# Patient Record
Sex: MALE | Race: WHITE | Employment: OTHER | URBAN - METROPOLITAN AREA
[De-identification: names, ages, dates, MRNs, and addresses within clinical notes are randomized per-mention and may not be internally consistent; named-entity substitution may affect disease eponyms.]

---

## 2020-03-01 ENCOUNTER — HOSPITAL ENCOUNTER (INPATIENT)
Age: 65
LOS: 3 days | Discharge: HOME | DRG: 312 | End: 2020-03-04
Attending: EMERGENCY MEDICINE | Admitting: INTERNAL MEDICINE
Payer: COMMERCIAL

## 2020-03-01 ENCOUNTER — APPOINTMENT (OUTPATIENT)
Dept: CT IMAGING | Age: 65
DRG: 312 | End: 2020-03-01
Payer: COMMERCIAL

## 2020-03-01 ENCOUNTER — APPOINTMENT (OUTPATIENT)
Dept: GENERAL RADIOLOGY | Age: 65
DRG: 312 | End: 2020-03-01
Payer: COMMERCIAL

## 2020-03-01 PROBLEM — R55 SYNCOPE AND COLLAPSE: Status: ACTIVE | Noted: 2020-03-01

## 2020-03-01 LAB
-: ABNORMAL
ABSOLUTE EOS #: 0.18 K/UL (ref 0–0.4)
ABSOLUTE IMMATURE GRANULOCYTE: 0 K/UL (ref 0–0.3)
ABSOLUTE LYMPH #: 2.39 K/UL (ref 1–4.8)
ABSOLUTE MONO #: 1.84 K/UL (ref 0.1–0.8)
ALBUMIN SERPL-MCNC: 3.4 G/DL (ref 3.5–5.2)
ALBUMIN/GLOBULIN RATIO: 0.7 (ref 1–2.5)
ALP BLD-CCNC: 76 U/L (ref 40–129)
ALT SERPL-CCNC: 15 U/L (ref 5–41)
AMORPHOUS: ABNORMAL
ANION GAP SERPL CALCULATED.3IONS-SCNC: 17 MMOL/L (ref 9–17)
AST SERPL-CCNC: 23 U/L
BACTERIA: ABNORMAL
BASOPHILS # BLD: 0 % (ref 0–2)
BASOPHILS ABSOLUTE: 0 K/UL (ref 0–0.2)
BILIRUB SERPL-MCNC: 0.82 MG/DL (ref 0.3–1.2)
BILIRUBIN DIRECT: 0.34 MG/DL
BILIRUBIN URINE: NEGATIVE
BILIRUBIN, INDIRECT: 0.48 MG/DL (ref 0–1)
BNP INTERPRETATION: ABNORMAL
BUN BLDV-MCNC: 29 MG/DL (ref 8–23)
BUN/CREAT BLD: ABNORMAL (ref 9–20)
CALCIUM SERPL-MCNC: 9.5 MG/DL (ref 8.6–10.4)
CASTS UA: ABNORMAL /LPF (ref 0–2)
CASTS UA: ABNORMAL /LPF (ref 0–2)
CHLORIDE BLD-SCNC: 94 MMOL/L (ref 98–107)
CO2: 19 MMOL/L (ref 20–31)
COLOR: YELLOW
CREAT SERPL-MCNC: 1.4 MG/DL (ref 0.7–1.2)
CRYSTALS, UA: ABNORMAL /HPF
DIFFERENTIAL TYPE: ABNORMAL
EOSINOPHILS RELATIVE PERCENT: 1 % (ref 1–4)
EPITHELIAL CELLS UA: ABNORMAL /HPF (ref 0–5)
GFR AFRICAN AMERICAN: >60 ML/MIN
GFR NON-AFRICAN AMERICAN: 51 ML/MIN
GFR SERPL CREATININE-BSD FRML MDRD: ABNORMAL ML/MIN/{1.73_M2}
GFR SERPL CREATININE-BSD FRML MDRD: ABNORMAL ML/MIN/{1.73_M2}
GLOBULIN: ABNORMAL G/DL (ref 1.5–3.8)
GLUCOSE BLD-MCNC: 236 MG/DL (ref 70–99)
GLUCOSE BLD-MCNC: 264 MG/DL (ref 75–110)
GLUCOSE URINE: NEGATIVE
HCT VFR BLD CALC: 38.6 % (ref 40.7–50.3)
HEMOGLOBIN: 12.4 G/DL (ref 13–17)
IMMATURE GRANULOCYTES: 0 %
INR BLD: 1.1
KETONES, URINE: NEGATIVE
LACTIC ACID, SEPSIS WHOLE BLOOD: 1.8 MMOL/L (ref 0.5–1.9)
LACTIC ACID, SEPSIS WHOLE BLOOD: 3.1 MMOL/L (ref 0.5–1.9)
LACTIC ACID, SEPSIS: ABNORMAL MMOL/L (ref 0.5–1.9)
LACTIC ACID, SEPSIS: NORMAL MMOL/L (ref 0.5–1.9)
LEUKOCYTE ESTERASE, URINE: NEGATIVE
LYMPHOCYTES # BLD: 13 % (ref 24–44)
MCH RBC QN AUTO: 29.7 PG (ref 25.2–33.5)
MCHC RBC AUTO-ENTMCNC: 32.1 G/DL (ref 28.4–34.8)
MCV RBC AUTO: 92.6 FL (ref 82.6–102.9)
MONOCYTES # BLD: 10 % (ref 1–7)
MORPHOLOGY: NORMAL
MUCUS: ABNORMAL
NITRITE, URINE: NEGATIVE
NRBC AUTOMATED: 0 PER 100 WBC
OTHER OBSERVATIONS UA: ABNORMAL
PARTIAL THROMBOPLASTIN TIME: 24.3 SEC (ref 20.5–30.5)
PDW BLD-RTO: 13.2 % (ref 11.8–14.4)
PH UA: 6 (ref 5–8)
PLATELET # BLD: 368 K/UL (ref 138–453)
PLATELET ESTIMATE: ABNORMAL
PMV BLD AUTO: 10.3 FL (ref 8.1–13.5)
POTASSIUM SERPL-SCNC: 3.7 MMOL/L (ref 3.7–5.3)
PRO-BNP: 319 PG/ML
PROTEIN UA: ABNORMAL
PROTHROMBIN TIME: 11.1 SEC (ref 9–12)
RBC # BLD: 4.17 M/UL (ref 4.21–5.77)
RBC # BLD: ABNORMAL 10*6/UL
RBC UA: ABNORMAL /HPF (ref 0–2)
RENAL EPITHELIAL, UA: ABNORMAL /HPF
SEG NEUTROPHILS: 76 % (ref 36–66)
SEGMENTED NEUTROPHILS ABSOLUTE COUNT: 13.99 K/UL (ref 1.8–7.7)
SODIUM BLD-SCNC: 130 MMOL/L (ref 135–144)
SPECIFIC GRAVITY UA: 1.04 (ref 1–1.03)
TOTAL PROTEIN: 8.1 G/DL (ref 6.4–8.3)
TRICHOMONAS: ABNORMAL
TROPONIN INTERP: NORMAL
TROPONIN INTERP: NORMAL
TROPONIN T: NORMAL NG/ML
TROPONIN T: NORMAL NG/ML
TROPONIN, HIGH SENSITIVITY: 16 NG/L (ref 0–22)
TROPONIN, HIGH SENSITIVITY: 16 NG/L (ref 0–22)
TURBIDITY: CLEAR
URINE HGB: NEGATIVE
UROBILINOGEN, URINE: NORMAL
WBC # BLD: 18.4 K/UL (ref 3.5–11.3)
WBC # BLD: ABNORMAL 10*3/UL
WBC UA: ABNORMAL /HPF (ref 0–5)
YEAST: ABNORMAL

## 2020-03-01 PROCEDURE — 84146 ASSAY OF PROLACTIN: CPT

## 2020-03-01 PROCEDURE — 82947 ASSAY GLUCOSE BLOOD QUANT: CPT

## 2020-03-01 PROCEDURE — 87040 BLOOD CULTURE FOR BACTERIA: CPT

## 2020-03-01 PROCEDURE — 71260 CT THORAX DX C+: CPT

## 2020-03-01 PROCEDURE — 6370000000 HC RX 637 (ALT 250 FOR IP): Performed by: STUDENT IN AN ORGANIZED HEALTH CARE EDUCATION/TRAINING PROGRAM

## 2020-03-01 PROCEDURE — 83880 ASSAY OF NATRIURETIC PEPTIDE: CPT

## 2020-03-01 PROCEDURE — 81001 URINALYSIS AUTO W/SCOPE: CPT

## 2020-03-01 PROCEDURE — 96365 THER/PROPH/DIAG IV INF INIT: CPT

## 2020-03-01 PROCEDURE — 83605 ASSAY OF LACTIC ACID: CPT

## 2020-03-01 PROCEDURE — 96366 THER/PROPH/DIAG IV INF ADDON: CPT

## 2020-03-01 PROCEDURE — 6360000002 HC RX W HCPCS: Performed by: STUDENT IN AN ORGANIZED HEALTH CARE EDUCATION/TRAINING PROGRAM

## 2020-03-01 PROCEDURE — 99223 1ST HOSP IP/OBS HIGH 75: CPT | Performed by: NURSE PRACTITIONER

## 2020-03-01 PROCEDURE — 96367 TX/PROPH/DG ADDL SEQ IV INF: CPT

## 2020-03-01 PROCEDURE — 96368 THER/DIAG CONCURRENT INF: CPT

## 2020-03-01 PROCEDURE — 80076 HEPATIC FUNCTION PANEL: CPT

## 2020-03-01 PROCEDURE — 2060000000 HC ICU INTERMEDIATE R&B

## 2020-03-01 PROCEDURE — 85025 COMPLETE CBC W/AUTO DIFF WBC: CPT

## 2020-03-01 PROCEDURE — 85730 THROMBOPLASTIN TIME PARTIAL: CPT

## 2020-03-01 PROCEDURE — G0378 HOSPITAL OBSERVATION PER HR: HCPCS

## 2020-03-01 PROCEDURE — 80048 BASIC METABOLIC PNL TOTAL CA: CPT

## 2020-03-01 PROCEDURE — 71046 X-RAY EXAM CHEST 2 VIEWS: CPT

## 2020-03-01 PROCEDURE — 87086 URINE CULTURE/COLONY COUNT: CPT

## 2020-03-01 PROCEDURE — 83036 HEMOGLOBIN GLYCOSYLATED A1C: CPT

## 2020-03-01 PROCEDURE — 6360000004 HC RX CONTRAST MEDICATION: Performed by: EMERGENCY MEDICINE

## 2020-03-01 PROCEDURE — 84484 ASSAY OF TROPONIN QUANT: CPT

## 2020-03-01 PROCEDURE — 93005 ELECTROCARDIOGRAM TRACING: CPT | Performed by: STUDENT IN AN ORGANIZED HEALTH CARE EDUCATION/TRAINING PROGRAM

## 2020-03-01 PROCEDURE — 85610 PROTHROMBIN TIME: CPT

## 2020-03-01 PROCEDURE — 2580000003 HC RX 258: Performed by: STUDENT IN AN ORGANIZED HEALTH CARE EDUCATION/TRAINING PROGRAM

## 2020-03-01 PROCEDURE — 99285 EMERGENCY DEPT VISIT HI MDM: CPT

## 2020-03-01 RX ORDER — DEXTROSE MONOHYDRATE 50 MG/ML
100 INJECTION, SOLUTION INTRAVENOUS PRN
Status: DISCONTINUED | OUTPATIENT
Start: 2020-03-01 | End: 2020-03-04 | Stop reason: HOSPADM

## 2020-03-01 RX ORDER — DEXTROSE MONOHYDRATE 25 G/50ML
12.5 INJECTION, SOLUTION INTRAVENOUS PRN
Status: DISCONTINUED | OUTPATIENT
Start: 2020-03-01 | End: 2020-03-04 | Stop reason: HOSPADM

## 2020-03-01 RX ORDER — ALPRAZOLAM 0.5 MG/1
0.5 TABLET ORAL 2 TIMES DAILY
Status: DISCONTINUED | OUTPATIENT
Start: 2020-03-02 | End: 2020-03-04 | Stop reason: HOSPADM

## 2020-03-01 RX ORDER — MAGNESIUM SULFATE 1 G/100ML
1 INJECTION INTRAVENOUS PRN
Status: DISCONTINUED | OUTPATIENT
Start: 2020-03-01 | End: 2020-03-04 | Stop reason: HOSPADM

## 2020-03-01 RX ORDER — PROMETHAZINE HYDROCHLORIDE 25 MG/1
12.5 TABLET ORAL EVERY 6 HOURS PRN
Status: DISCONTINUED | OUTPATIENT
Start: 2020-03-01 | End: 2020-03-04 | Stop reason: HOSPADM

## 2020-03-01 RX ORDER — SODIUM CHLORIDE 0.9 % (FLUSH) 0.9 %
10 SYRINGE (ML) INJECTION PRN
Status: DISCONTINUED | OUTPATIENT
Start: 2020-03-01 | End: 2020-03-04 | Stop reason: HOSPADM

## 2020-03-01 RX ORDER — HYDROCODONE BITARTRATE AND ACETAMINOPHEN 5; 325 MG/1; MG/1
1 TABLET ORAL ONCE
Status: COMPLETED | OUTPATIENT
Start: 2020-03-01 | End: 2020-03-01

## 2020-03-01 RX ORDER — SODIUM CHLORIDE 0.9 % (FLUSH) 0.9 %
10 SYRINGE (ML) INJECTION EVERY 12 HOURS SCHEDULED
Status: DISCONTINUED | OUTPATIENT
Start: 2020-03-02 | End: 2020-03-04 | Stop reason: HOSPADM

## 2020-03-01 RX ORDER — ACETAMINOPHEN 325 MG/1
650 TABLET ORAL EVERY 6 HOURS PRN
Status: DISCONTINUED | OUTPATIENT
Start: 2020-03-01 | End: 2020-03-04 | Stop reason: HOSPADM

## 2020-03-01 RX ORDER — ASPIRIN 81 MG/1
81 TABLET ORAL DAILY
Status: DISCONTINUED | OUTPATIENT
Start: 2020-03-02 | End: 2020-03-04 | Stop reason: HOSPADM

## 2020-03-01 RX ORDER — GABAPENTIN 600 MG/1
600 TABLET ORAL 3 TIMES DAILY
Status: DISCONTINUED | OUTPATIENT
Start: 2020-03-02 | End: 2020-03-04 | Stop reason: HOSPADM

## 2020-03-01 RX ORDER — POTASSIUM CHLORIDE 7.45 MG/ML
10 INJECTION INTRAVENOUS PRN
Status: DISCONTINUED | OUTPATIENT
Start: 2020-03-01 | End: 2020-03-04 | Stop reason: HOSPADM

## 2020-03-01 RX ORDER — SODIUM CHLORIDE 9 MG/ML
INJECTION, SOLUTION INTRAVENOUS CONTINUOUS
Status: DISCONTINUED | OUTPATIENT
Start: 2020-03-02 | End: 2020-03-04 | Stop reason: HOSPADM

## 2020-03-01 RX ORDER — LIDOCAINE HYDROCHLORIDE 20 MG/ML
JELLY TOPICAL PRN
Status: DISCONTINUED | OUTPATIENT
Start: 2020-03-01 | End: 2020-03-04 | Stop reason: HOSPADM

## 2020-03-01 RX ORDER — ACETAMINOPHEN 650 MG/1
650 SUPPOSITORY RECTAL EVERY 6 HOURS PRN
Status: DISCONTINUED | OUTPATIENT
Start: 2020-03-01 | End: 2020-03-04 | Stop reason: HOSPADM

## 2020-03-01 RX ORDER — ONDANSETRON 2 MG/ML
4 INJECTION INTRAMUSCULAR; INTRAVENOUS EVERY 6 HOURS PRN
Status: DISCONTINUED | OUTPATIENT
Start: 2020-03-01 | End: 2020-03-04 | Stop reason: HOSPADM

## 2020-03-01 RX ORDER — LISINOPRIL AND HYDROCHLOROTHIAZIDE 20; 12.5 MG/1; MG/1
1 TABLET ORAL 2 TIMES DAILY
Status: DISCONTINUED | OUTPATIENT
Start: 2020-03-02 | End: 2020-03-03

## 2020-03-01 RX ORDER — POLYETHYLENE GLYCOL 3350 17 G/17G
17 POWDER, FOR SOLUTION ORAL DAILY PRN
Status: DISCONTINUED | OUTPATIENT
Start: 2020-03-01 | End: 2020-03-04 | Stop reason: HOSPADM

## 2020-03-01 RX ORDER — SODIUM CHLORIDE, SODIUM LACTATE, POTASSIUM CHLORIDE, CALCIUM CHLORIDE 600; 310; 30; 20 MG/100ML; MG/100ML; MG/100ML; MG/100ML
1000 INJECTION, SOLUTION INTRAVENOUS ONCE
Status: COMPLETED | OUTPATIENT
Start: 2020-03-01 | End: 2020-03-01

## 2020-03-01 RX ORDER — VANCOMYCIN HYDROCHLORIDE 1 G/200ML
1000 INJECTION, SOLUTION INTRAVENOUS EVERY 12 HOURS
Status: DISCONTINUED | OUTPATIENT
Start: 2020-03-02 | End: 2020-03-01

## 2020-03-01 RX ORDER — ACETAMINOPHEN 325 MG/1
650 TABLET ORAL EVERY 6 HOURS PRN
Status: DISCONTINUED | OUTPATIENT
Start: 2020-03-01 | End: 2020-03-01

## 2020-03-01 RX ORDER — NICOTINE POLACRILEX 4 MG
15 LOZENGE BUCCAL PRN
Status: DISCONTINUED | OUTPATIENT
Start: 2020-03-01 | End: 2020-03-04 | Stop reason: HOSPADM

## 2020-03-01 RX ORDER — ACETAMINOPHEN 650 MG/1
650 SUPPOSITORY RECTAL EVERY 6 HOURS PRN
Status: DISCONTINUED | OUTPATIENT
Start: 2020-03-01 | End: 2020-03-01

## 2020-03-01 RX ORDER — NICOTINE 21 MG/24HR
1 PATCH, TRANSDERMAL 24 HOURS TRANSDERMAL DAILY PRN
Status: DISCONTINUED | OUTPATIENT
Start: 2020-03-01 | End: 2020-03-04 | Stop reason: HOSPADM

## 2020-03-01 RX ORDER — POTASSIUM CHLORIDE 20 MEQ/1
40 TABLET, EXTENDED RELEASE ORAL PRN
Status: DISCONTINUED | OUTPATIENT
Start: 2020-03-01 | End: 2020-03-04 | Stop reason: HOSPADM

## 2020-03-01 RX ADMIN — VANCOMYCIN HYDROCHLORIDE 2000 MG: 1 INJECTION, POWDER, LYOPHILIZED, FOR SOLUTION INTRAVENOUS at 20:41

## 2020-03-01 RX ADMIN — PIPERACILLIN AND TAZOBACTAM 3.38 G: 3; .375 INJECTION, POWDER, FOR SOLUTION INTRAVENOUS at 19:59

## 2020-03-01 RX ADMIN — IOHEXOL 75 ML: 350 INJECTION, SOLUTION INTRAVENOUS at 20:07

## 2020-03-01 RX ADMIN — HYDROCODONE BITARTRATE AND ACETAMINOPHEN 1 TABLET: 5; 325 TABLET ORAL at 21:09

## 2020-03-01 RX ADMIN — SODIUM CHLORIDE, POTASSIUM CHLORIDE, SODIUM LACTATE AND CALCIUM CHLORIDE 1000 ML: 600; 310; 30; 20 INJECTION, SOLUTION INTRAVENOUS at 19:16

## 2020-03-01 ASSESSMENT — ENCOUNTER SYMPTOMS
ABDOMINAL PAIN: 0
COLOR CHANGE: 0
CHEST TIGHTNESS: 0
VOMITING: 0
CONSTIPATION: 0
TROUBLE SWALLOWING: 0
SHORTNESS OF BREATH: 0
BACK PAIN: 0
NAUSEA: 0
DIARRHEA: 0
COUGH: 0

## 2020-03-01 ASSESSMENT — PAIN SCALES - GENERAL: PAINLEVEL_OUTOF10: 8

## 2020-03-01 NOTE — ED PROVIDER NOTES
[facility]     Emergency Department     Faculty Attestation    I performed a history and physical examination of the patient and discussed management with the resident. I reviewed the residents note and agree with the documented findings and plan of care. Any areas of disagreement are noted on the chart. I was personally present for the key portions of any procedures. I have documented in the chart those procedures where I was not present during the key portions. I have reviewed the emergency nurses triage note. I agree with the chief complaint, past medical history, past surgical history, allergies, medications, social and family history as documented unless otherwise noted below. For Physician Assistant/ Nurse Practitioner cases/documentation I have personally evaluated this patient and have completed at least one if not all key elements of the E/M (history, physical exam, and MDM). Additional findings are as noted. I have personally seen and evaluated the patient. I find the patient's history and physical exam are consistent with the NP/PA documentation. I agree with the care provided, treatment rendered, disposition and follow-up plan. Patient had episode of syncope today no preceding symptoms and no chest pain no shortness of breath currently asymptomatic. EKG Interpretation    Interpreted by emergency department physician    Rhythm: normal sinus   Rate: normal  Axis: normal  Conduction: normal  ST Segments: no acute change  T Waves: no acute change  Q Waves: no acute change    Clinical Impression:  nonspecific EKG. Critical Care     Waylon Sal M.D.   Attending Emergency  Physician              Katrin Jaimes MD  03/01/20 [id number]

## 2020-03-01 NOTE — ED NOTES
Pt placed on cardiac monitor, BP cuff, and pulse ox. Alarms set.       Syed Betancourt RN  03/01/20 3294

## 2020-03-01 NOTE — ED NOTES
Patient brought into er per EMS from home today for syncopal episode at home PTA while sitting watching TV  Reports that he does not remember syncopal episode, family at home when episode occurred and called EMS  Pt arrives to ER a&o x4  Nondistressed  EMS reports that patients EKG was stamping STEMI multiple times  Upon arrival to ER, EKG completed and not showing STEMI    Placed on full cardiac monitor    IV established PTA per EMS, blood labs collected     GCS=15   Denies N/V/D noted at this time    Nondistressed, VS STABLE  Patient updated on plan of care and processes  Denies complaints     [address] Mary Ann, RN  03/01/20 5712

## 2020-03-01 NOTE — ED PROVIDER NOTES
[facility] ED  Emergency Department Encounter  EmergencyMedicine Resident     Pt Name:Raheel Ash  MRN: [id number]  [place] 1955  Date of evaluation: 3/1/20  PCP:  Pilo Villarreal MD    [address] [place] [address]       Chief Complaint   Patient presents with    Loss of Consciousness       HISTORY OF PRESENT ILLNESS  (Location/Symptom, Timing/Onset, Context/Setting, Quality, Duration, Modifying Factors, Severity.)      Burke Rhodes is a 71 y.o. male who presents with acute syncopal episode at home while watching TV. Patient was in his recliner watching TV in the next thing he remembers is his wife waking him up on the floor. Unclear per EMS how long he was unresponsive for however patient does not remember the transition from the chair to the floor. No seizure-like activity described without a postictal state. History of type 2 diabetes on long-acting insulin twice daily and antihypertensive medications. Patient says he is been compliant with all medications and all doctor's appointments. No complaints at this time, feels well. Prior to this episode has been feeling well without any fevers, chills, vision changes, headaches, nausea, vomiting, chest pain, shortness breath, abdominal pain, changes in  or GI habits. PAST MEDICAL / SURGICAL / SOCIAL / FAMILY HISTORY      has a past medical history of Anxiety, Arthritis, Asthma, Hypertension, Neuropathy, Tremor, and Type II or unspecified type diabetes mellitus without mention of complication, not stated as uncontrolled. has a past surgical history that includes Tonsillectomy.     Social History     Socioeconomic History    Marital status:      Spouse name: Not on file    Number of children: Not on file    Years of education: Not on file    Highest education level: Not on file   Occupational History    Occupation: disability   Social Needs    Financial resource strain: Not on file    Food insecurity:     Worry: Not on file sec    INR 1.1    APTT   Result Value Ref Range    PTT 24.3 20.5 - 30.5 sec         RADIOLOGY:  Xr Chest Standard (2 Vw)    Result Date: 3/1/2020  EXAMINATION: TWO XRAY VIEWS OF THE CHEST 3/1/2020 6:22 pm COMPARISON: None. HISTORY: ORDERING SYSTEM PROVIDED HISTORY: Syncope TECHNOLOGIST PROVIDED HISTORY: Syncope Reason for Exam: upright/ c/o syncope with LOC Acuity: Acute Type of Exam: Initial FINDINGS: Cardiomegaly is present. Interstitial prominence is seen throughout the lungs, and may represent an acute process such as early interstitial edema versus chronic interstitial lung disease. No pneumothorax or focal area of consolidation is present. Osseous structures appear normal for age. Fullness of the superior mediastinum is noted. 1. Cardiomegaly 2. Prominent interstitial markings, differential considerations to include an acute process such as interstitial edema versus chronic underlying interstitial lung disease 3. Fullness of the mediastinum. CT imaging of the chest would be helpful for further evaluation. EKG  EKG Interpretation    Interpreted by emergency department physician    Rhythm: normal sinus With frequent PVCs  Rate: tachycardia  Axis: normal  Ectopy: premature ventricular contractions (frequent)  Conduction: normal  ST Segments: nonspecific changes  T Waves: non specific changes  Q Waves: none    Clinical Impression: non-specific EKG without STEMI    Kaur Hernandez      All EKG's are interpreted by the Emergency Department Physician who either signs or Co-signs this chart in the absence of a cardiologist.    EMERGENCY DEPARTMENT COURSE:  Report initially called for syncopal episode with EKG concerning for STEMI per the computer read. 12-lead was transmitted and it appears there was baseline variability causing the computer to read STEMI. STEMI not called prior to arrival.  Patient met at room on arrival with EMS.   Repeat EKG immediately repeated with nonspecific T wave changes and frequent PVCs without ST changes. IV access obtained prehospital, alert and oriented x4 without complaint both prehospital and and presentation. Prehospital glucose in the 200s. On arrival patient alert and oriented, no acute distress, not ill or toxic appearing. Engaged in cooperative exam.  Very pleasant and conversational without any complaints or concerns other than his single syncopal episode. He has been previously healthy, following with his physicians for diabetes and hypertension as previously scheduled taking all medications as prescribed. Does not remember the episode so cannot explain or say what happened. Cardiac work-up initiated. Patient is not anemic, is on insulin and sulfonylureas but glucoses in the 200s. Patient does not have any pain so there is less concern for an aortic dissection or aortic rupture. Not tachycardic without hypoxia lower concern for PE. No postictal state and no shaking lower concern for seizure. As patient was sitting in his chair do not think it was vasovagal or orthostatic in nature however there is not a reliable history if patient had a prodrome or not. Labs resulted with elevated white count, as he is tachycardic initiated septic work-up as well. Started a 1 L fluid bolus. Chest x-ray with pulmonary disease consistent with physical exam finding of nail clubbing with possibly widened mediastinum. CT was recommended, will obtain PE study as long as scan is being performed. Added BNP given and cardiomegaly on chest x-ray. Lactate resulted at 3.1, increasing concern for infectious etiology. Empirically starting vancomycin and Zosyn for unknown source. Work-up still pending at time of signout. Patient signed out to Dr. Franky Mast. Patient will require admission when work-up is complete. PROCEDURES:  None    CONSULTS:  PHARMACY TO DOSE VANCOMYCIN    CRITICAL CARE:  None    FINAL IMPRESSION      1.  Syncope and collapse          DISPOSITION / PLAN

## 2020-03-01 NOTE — ED NOTES
Dr Mahendra Coles at bedside for evaluation at this time     Mary Genao, [facility]  03/01/20 4419

## 2020-03-02 ENCOUNTER — APPOINTMENT (OUTPATIENT)
Dept: CT IMAGING | Age: 65
DRG: 312 | End: 2020-03-02
Payer: COMMERCIAL

## 2020-03-02 PROBLEM — I95.1 ORTHOSTASIS: Status: ACTIVE | Noted: 2020-03-02

## 2020-03-02 PROBLEM — I10 ESSENTIAL HYPERTENSION: Status: ACTIVE | Noted: 2020-03-02

## 2020-03-02 PROBLEM — J84.9 INTERSTITIAL LUNG DISEASE (HCC): Status: ACTIVE | Noted: 2020-03-02

## 2020-03-02 PROBLEM — D72.829 LEUKOCYTOSIS: Status: ACTIVE | Noted: 2020-03-02

## 2020-03-02 PROBLEM — I31.39 PERICARDIAL EFFUSION: Status: ACTIVE | Noted: 2020-03-02

## 2020-03-02 PROBLEM — I51.7 CARDIOMEGALY: Status: ACTIVE | Noted: 2020-03-02

## 2020-03-02 PROBLEM — N17.9 AKI (ACUTE KIDNEY INJURY) (HCC): Status: ACTIVE | Noted: 2020-03-02

## 2020-03-02 PROBLEM — E11.40 CONTROLLED TYPE 2 DIABETES MELLITUS WITH NEUROPATHY (HCC): Status: ACTIVE | Noted: 2020-03-02

## 2020-03-02 PROBLEM — E87.20 LACTIC ACIDOSIS: Status: ACTIVE | Noted: 2020-03-02

## 2020-03-02 PROBLEM — J45.20 MILD INTERMITTENT ASTHMA WITHOUT COMPLICATION: Status: ACTIVE | Noted: 2020-03-02

## 2020-03-02 LAB
ANION GAP SERPL CALCULATED.3IONS-SCNC: 18 MMOL/L (ref 9–17)
BNP INTERPRETATION: NORMAL
BUN BLDV-MCNC: 28 MG/DL (ref 8–23)
BUN/CREAT BLD: ABNORMAL (ref 9–20)
CALCIUM IONIZED: 1.11 MMOL/L (ref 1.13–1.33)
CALCIUM SERPL-MCNC: 9.2 MG/DL (ref 8.6–10.4)
CHLORIDE BLD-SCNC: 97 MMOL/L (ref 98–107)
CO2: 17 MMOL/L (ref 20–31)
CREAT SERPL-MCNC: 1.24 MG/DL (ref 0.7–1.2)
CULTURE: NORMAL
ESTIMATED AVERAGE GLUCOSE: 174 MG/DL
GFR AFRICAN AMERICAN: >60 ML/MIN
GFR NON-AFRICAN AMERICAN: 59 ML/MIN
GFR SERPL CREATININE-BSD FRML MDRD: ABNORMAL ML/MIN/{1.73_M2}
GFR SERPL CREATININE-BSD FRML MDRD: ABNORMAL ML/MIN/{1.73_M2}
GLUCOSE BLD-MCNC: 145 MG/DL (ref 75–110)
GLUCOSE BLD-MCNC: 148 MG/DL (ref 75–110)
GLUCOSE BLD-MCNC: 208 MG/DL (ref 75–110)
GLUCOSE BLD-MCNC: 230 MG/DL (ref 70–99)
GLUCOSE BLD-MCNC: 254 MG/DL (ref 75–110)
HBA1C MFR BLD: 7.7 % (ref 4–6)
HCT VFR BLD CALC: 36.5 % (ref 40.7–50.3)
HEMOGLOBIN: 11.6 G/DL (ref 13–17)
LACTIC ACID, SEPSIS WHOLE BLOOD: 1.7 MMOL/L (ref 0.5–1.9)
LACTIC ACID, SEPSIS WHOLE BLOOD: 2.1 MMOL/L (ref 0.5–1.9)
LACTIC ACID, SEPSIS: ABNORMAL MMOL/L (ref 0.5–1.9)
LACTIC ACID, SEPSIS: NORMAL MMOL/L (ref 0.5–1.9)
Lab: NORMAL
MAGNESIUM: 2 MG/DL (ref 1.6–2.6)
MCH RBC QN AUTO: 29.3 PG (ref 25.2–33.5)
MCHC RBC AUTO-ENTMCNC: 31.8 G/DL (ref 28.4–34.8)
MCV RBC AUTO: 92.2 FL (ref 82.6–102.9)
NRBC AUTOMATED: 0 PER 100 WBC
PDW BLD-RTO: 13.2 % (ref 11.8–14.4)
PHOSPHORUS: 3.8 MG/DL (ref 2.5–4.5)
PLATELET # BLD: 343 K/UL (ref 138–453)
PMV BLD AUTO: 10.5 FL (ref 8.1–13.5)
POTASSIUM SERPL-SCNC: 4.2 MMOL/L (ref 3.7–5.3)
PRO-BNP: 272 PG/ML
PROCALCITONIN: 0.16 NG/ML
PROCALCITONIN: 0.16 NG/ML
PROLACTIN: 21.8 UG/L (ref 4.04–15.2)
RBC # BLD: 3.96 M/UL (ref 4.21–5.77)
SODIUM BLD-SCNC: 132 MMOL/L (ref 135–144)
SPECIMEN DESCRIPTION: NORMAL
TROPONIN INTERP: NORMAL
TROPONIN INTERP: NORMAL
TROPONIN T: NORMAL NG/ML
TROPONIN T: NORMAL NG/ML
TROPONIN, HIGH SENSITIVITY: 16 NG/L (ref 0–22)
TROPONIN, HIGH SENSITIVITY: 17 NG/L (ref 0–22)
TSH SERPL DL<=0.05 MIU/L-ACNC: 3.3 MIU/L (ref 0.3–5)
WBC # BLD: 16.4 K/UL (ref 3.5–11.3)

## 2020-03-02 PROCEDURE — 80048 BASIC METABOLIC PNL TOTAL CA: CPT

## 2020-03-02 PROCEDURE — 6360000002 HC RX W HCPCS: Performed by: EMERGENCY MEDICINE

## 2020-03-02 PROCEDURE — 87040 BLOOD CULTURE FOR BACTERIA: CPT

## 2020-03-02 PROCEDURE — 83735 ASSAY OF MAGNESIUM: CPT

## 2020-03-02 PROCEDURE — 6370000000 HC RX 637 (ALT 250 FOR IP): Performed by: NURSE PRACTITIONER

## 2020-03-02 PROCEDURE — 93005 ELECTROCARDIOGRAM TRACING: CPT | Performed by: NURSE PRACTITIONER

## 2020-03-02 PROCEDURE — 70450 CT HEAD/BRAIN W/O DYE: CPT

## 2020-03-02 PROCEDURE — 2060000000 HC ICU INTERMEDIATE R&B

## 2020-03-02 PROCEDURE — 83605 ASSAY OF LACTIC ACID: CPT

## 2020-03-02 PROCEDURE — 85027 COMPLETE CBC AUTOMATED: CPT

## 2020-03-02 PROCEDURE — 97535 SELF CARE MNGMENT TRAINING: CPT

## 2020-03-02 PROCEDURE — 97166 OT EVAL MOD COMPLEX 45 MIN: CPT

## 2020-03-02 PROCEDURE — 36415 COLL VENOUS BLD VENIPUNCTURE: CPT

## 2020-03-02 PROCEDURE — 2580000003 HC RX 258: Performed by: NURSE PRACTITIONER

## 2020-03-02 PROCEDURE — G0378 HOSPITAL OBSERVATION PER HR: HCPCS

## 2020-03-02 PROCEDURE — 97530 THERAPEUTIC ACTIVITIES: CPT

## 2020-03-02 PROCEDURE — 84100 ASSAY OF PHOSPHORUS: CPT

## 2020-03-02 PROCEDURE — 84484 ASSAY OF TROPONIN QUANT: CPT

## 2020-03-02 PROCEDURE — 99232 SBSQ HOSP IP/OBS MODERATE 35: CPT | Performed by: INTERNAL MEDICINE

## 2020-03-02 PROCEDURE — 2580000003 HC RX 258: Performed by: EMERGENCY MEDICINE

## 2020-03-02 PROCEDURE — 97162 PT EVAL MOD COMPLEX 30 MIN: CPT

## 2020-03-02 PROCEDURE — 6360000002 HC RX W HCPCS: Performed by: NURSE PRACTITIONER

## 2020-03-02 PROCEDURE — 84443 ASSAY THYROID STIM HORMONE: CPT

## 2020-03-02 PROCEDURE — 82330 ASSAY OF CALCIUM: CPT

## 2020-03-02 PROCEDURE — 96376 TX/PRO/DX INJ SAME DRUG ADON: CPT

## 2020-03-02 PROCEDURE — 84145 PROCALCITONIN (PCT): CPT

## 2020-03-02 PROCEDURE — 96366 THER/PROPH/DIAG IV INF ADDON: CPT

## 2020-03-02 PROCEDURE — 82947 ASSAY GLUCOSE BLOOD QUANT: CPT

## 2020-03-02 PROCEDURE — 99222 1ST HOSP IP/OBS MODERATE 55: CPT | Performed by: NURSE PRACTITIONER

## 2020-03-02 RX ORDER — ALBUTEROL SULFATE 2.5 MG/3ML
2.5 SOLUTION RESPIRATORY (INHALATION) EVERY 4 HOURS PRN
Status: DISCONTINUED | OUTPATIENT
Start: 2020-03-02 | End: 2020-03-04 | Stop reason: HOSPADM

## 2020-03-02 RX ORDER — 0.9 % SODIUM CHLORIDE 0.9 %
1000 INTRAVENOUS SOLUTION INTRAVENOUS ONCE
Status: COMPLETED | OUTPATIENT
Start: 2020-03-02 | End: 2020-03-02

## 2020-03-02 RX ADMIN — GABAPENTIN 600 MG: 600 TABLET ORAL at 00:51

## 2020-03-02 RX ADMIN — INSULIN LISPRO 6 UNITS: 100 INJECTION, SOLUTION INTRAVENOUS; SUBCUTANEOUS at 12:07

## 2020-03-02 RX ADMIN — GABAPENTIN 600 MG: 600 TABLET ORAL at 14:40

## 2020-03-02 RX ADMIN — Medication 81 MG: at 08:47

## 2020-03-02 RX ADMIN — GABAPENTIN 600 MG: 600 TABLET ORAL at 20:40

## 2020-03-02 RX ADMIN — METOPROLOL TARTRATE 12.5 MG: 25 TABLET ORAL at 08:47

## 2020-03-02 RX ADMIN — METOPROLOL TARTRATE 12.5 MG: 25 TABLET ORAL at 00:51

## 2020-03-02 RX ADMIN — INSULIN LISPRO 3 UNITS: 100 INJECTION, SOLUTION INTRAVENOUS; SUBCUTANEOUS at 00:02

## 2020-03-02 RX ADMIN — VANCOMYCIN HYDROCHLORIDE 1750 MG: 10 INJECTION, POWDER, LYOPHILIZED, FOR SOLUTION INTRAVENOUS at 08:57

## 2020-03-02 RX ADMIN — ALPRAZOLAM 0.5 MG: 0.5 TABLET ORAL at 08:47

## 2020-03-02 RX ADMIN — INSULIN LISPRO 1 UNITS: 100 INJECTION, SOLUTION INTRAVENOUS; SUBCUTANEOUS at 20:42

## 2020-03-02 RX ADMIN — PIPERACILLIN AND TAZOBACTAM 3.38 G: 3; .375 INJECTION, POWDER, FOR SOLUTION INTRAVENOUS at 04:19

## 2020-03-02 RX ADMIN — ALPRAZOLAM 0.5 MG: 0.5 TABLET ORAL at 20:40

## 2020-03-02 RX ADMIN — SODIUM CHLORIDE: 9 INJECTION, SOLUTION INTRAVENOUS at 20:40

## 2020-03-02 RX ADMIN — METOPROLOL TARTRATE 12.5 MG: 25 TABLET ORAL at 20:40

## 2020-03-02 RX ADMIN — SERTRALINE 100 MG: 50 TABLET, FILM COATED ORAL at 08:47

## 2020-03-02 RX ADMIN — GABAPENTIN 600 MG: 600 TABLET ORAL at 08:47

## 2020-03-02 RX ADMIN — SODIUM CHLORIDE 1000 ML: 9 INJECTION, SOLUTION INTRAVENOUS at 06:31

## 2020-03-02 RX ADMIN — SODIUM CHLORIDE: 9 INJECTION, SOLUTION INTRAVENOUS at 00:02

## 2020-03-02 ASSESSMENT — PAIN SCALES - GENERAL
PAINLEVEL_OUTOF10: 0
PAINLEVEL_OUTOF10: 0

## 2020-03-02 ASSESSMENT — ENCOUNTER SYMPTOMS
CONSTIPATION: 0
STRIDOR: 0
BLOOD IN STOOL: 0
WHEEZING: 0
ABDOMINAL PAIN: 0
VOMITING: 0
COUGH: 0
NAUSEA: 0
SHORTNESS OF BREATH: 0
DIARRHEA: 0

## 2020-03-02 NOTE — PROGRESS NOTES
Pharmacy Note  Vancomycin Consult    Aurora Casillas is a 71 y.o. male started on Vancomycin; consult received from Dr. Shereen Monk to manage therapy. Patient Active Problem List   Diagnosis    Chronic pain    Chronic, continuous use of opioids    Back pain    Leg pain    Chronic radicular lumbar pain    Chronic low back pain       Allergies:  Morphine     Temp max: 98.1    Recent Labs     03/01/20  1814   BUN 29*       Recent Labs     03/01/20  1814   CREATININE 1.40*       Recent Labs     03/01/20  1814   WBC 18.4*       No intake or output data in the 24 hours ending 03/01/20 1949    Culture Date      Source                       Results  3-1-20                         Urine              Pending    Ht Readings from Last 1 Encounters:   03/01/20 6' 1\" (1.854 m)        Wt Readings from Last 1 Encounters:   03/01/20 281 lb (127.5 kg)         Body mass index is 37.07 kg/m². Estimated Creatinine Clearance: 74 mL/min (A) (based on SCr of 1.4 mg/dL (H)). Goal Trough Level: 15-20 mcg/mL    Assessment/Plan:  Will initiate vancomycin 2000 mg IV  once as a loading dose followed by vancomycin 1750 mg every 12 hours. Timing of trough level will be determined based on culture results, renal function, and clinical response. Thank you for the consult. Will continue to follow.     Nikita Odonnell, PharmD   3/1/2020 7:51 PM

## 2020-03-02 NOTE — CONSULTS
NEUROLOGY INPATIENT CONSULT NOTE      3/2/2020         I had the pleasure of seeing your patient as a neurology consultation. As you would recall Brett Grove is a  71 y.o. male with H/O HTN, neuropathy, DM, who was admitted on 3/1/2020 with Syncope and collapse [R55]. The patient reports he was sitting in a recliner at home watching TV and the next thing he remembers his wife was waking him up and he was on the floor. He does not recall the event itself. Denies any preceding lightheadedness, vertigo, nausea, vomiting, diaphoresis, chest pain, palpitations. Wife reported he looked pale. No confusion or fatigue following the event. He did not bite his tongue or have incontinence. No recent illness or injury. No history of similar spells. No history of seizures. On arrival he had CAMILLE with creatinine 1.40, leukocytosis with WBC 18.4. He was started empirically on Zosyn and Vanco. Negative UA. He was found to have frequent PVCs on EKG and cardiology was consulted. CT head is negative. Orthostatics positive. ECHO pending. The patient denies any complaints at time of exam. Denies headache, vision changes, numbness, tingling, weakness. Reports at home he uses a cane for ambulation due to occasional unsteadiness. No current facility-administered medications on file prior to encounter. Current Outpatient Medications on File Prior to Encounter   Medication Sig Dispense Refill    HYDROcodone-acetaminophen (NORCO) 5-325 MG per tablet Take 2 tablets by mouth every 6 hours as needed for Pain      ibuprofen (ADVIL;MOTRIN) 800 MG tablet Take 800 mg by mouth every 8 hours as needed for Pain      lisinopril-hydrochlorothiazide (PRINZIDE;ZESTORETIC) 20-12.5 MG per tablet Take 1 tablet by mouth 2 times daily.  glipiZIDE (GLUCOTROL) 10 MG tablet Take 5 mg by mouth every morning (before breakfast) Indications: every am       gabapentin (NEURONTIN) 600 MG tablet Take 600 mg by mouth 3 times daily.       metFORMIN for orthopnea, claudication and PND    Gastrointestinal  Negative for abdominal pain, diarrhea, blood in stool    Musculoskeletal  Negative for joint pain, negative for myalgia    Skin  Negative for rash or itching    Endo/heme/allergies  Negative for polydipsia, environmental allergy    Psychiatric/behavioral  Negative for suicidal ideation.  Patient is not anxious        Medications:     metoprolol tartrate  12.5 mg Oral BID    [START ON 3/3/2020] enoxaparin  30 mg Subcutaneous BID    ALPRAZolam  0.5 mg Oral BID    aspirin  81 mg Oral Daily    gabapentin  600 mg Oral TID    [Held by provider] lisinopril-hydroCHLOROthiazide  1 tablet Oral BID    sertraline  100 mg Oral Daily    sodium chloride flush  10 mL Intravenous 2 times per day    insulin lispro  0-12 Units Subcutaneous TID WC    insulin lispro  0-6 Units Subcutaneous Nightly     PRN Meds include: albuterol, potassium chloride **OR** potassium alternative oral replacement **OR** potassium chloride, magnesium sulfate, acetaminophen **OR** acetaminophen, polyethylene glycol, promethazine **OR** ondansetron, nicotine, glucose, dextrose, glucagon (rDNA), dextrose, sodium chloride flush, lidocaine    Objective:   /71   Pulse 94   Temp 98.1 °F (36.7 °C) (Oral)   Resp 18   Ht 6' 1\" (1.854 m)   Wt 280 lb 6.4 oz (127.2 kg)   SpO2 99%   BMI 36.99 kg/m²     Blood pressure range: Systolic (20BEI), IQA:666 , Min:102 , KJT:299   ; Diastolic (08AMG), SCY:05, Min:57, Max:88      General examination:   Head: Normocephalic, atraumatic  Eyes: Extraocular movements intact  Lungs: Respirations unlabored, chest wall no deformity  ENT: Normal external ear canals, no sinus tenderness  Heart: Regular rate rhythm  Abdomen: No masses, tenderness  Extremities: No cyanosis or edema, 2+ pulses  Skin: Intact, normal skin color      NEUROLOGIC EXAMINATION  GENERAL  Appears comfortable and in no distress   HEENT  NC/ AT   NECK  Supple   MENTAL STATUS:  Alert, oriented, intact memory, no confusion, dysarthric speech (does not have dentures in), normal language, no hallucination or delusion   CRANIAL NERVES: II     -      Visual fields intact to confrontation  III,IV,VI -  EOMs full, no afferent defect, no LLUVIA, no ptosis  V     -     Normal facial sensation  VII    -     Normal facial symmetry  VIII   -     Intact hearing  IX,X -     Symmetrical palate  XI    -     Symmetrical shoulder shrug  XII   -     Midline tongue, no atrophy    MOTOR FUNCTION:  Full strength to arms and legs with normal bulk, normal tone and no involuntary movements, no tremor   SENSORY FUNCTION:  Decreased sensation to BLE stocking distribution    CEREBELLAR FUNCTION:  Intact fine motor control over upper limbs with mild physiologic tremor   REFLEX FUNCTION:  Hypoactive KJ and AJ, no perverted reflex, no Babinski sign   STATION and GAIT  Patient unsteady upon standing, requiring assistance. Did not ambulate due to safety concerns       Data:    Lab Results:   CBC:   Recent Labs     20  1814 20  0409   WBC 18.4* 16.4*   HGB 12.4* 11.6*    343     BMP:    Recent Labs     20  1814 20  0409   * 132*   K 3.7 4.2   CL 94* 97*   CO2 19* 17*   BUN 29* 28*   CREATININE 1.40* 1.24*   GLUCOSE 236* 230*         Lab Results   Component Value Date    ALT 15 2020    AST 23 2020    TSH 3.30 2020    INR 1.1 2020    LABA1C 7.7 (H) 2020           Diagnostic data reviewed:  CT HEAD (3/2/20) -  No acute intracranial abnormality. ECHO - pending      ORTHOSTATIC BP:  Lyin/70  Sittin/67  Standin/42                  Impression:  -Syncope  -Orthostatic hypotension  -Comorbid diabetes with neuropathy      Plan:  -EEG  -MRI brain, MRA head and neck  -Orthostatic BP BID  -Cardiology is following; ECHO pending  -Will follow with you. Thank you for the consult. Please note that this note was generated using a voice recognition dictation software.

## 2020-03-02 NOTE — PROGRESS NOTES
[facility] 18    Progress Note    3/2/2020    11:20 AM    Name:   Dang Bui  MRN:     [id number]     Acct:      [de-identified]   Room:   [facility] Day:  1  Admit Date:  3/1/2020  6:06 PM    PCP:   Lani Swenson MD  Code Status:  Full Code    Subjective:     C/C:   Chief Complaint   Patient presents with    Loss of Consciousness     Interval History Status: improved. Patient seen and examined this morning. Admitted overnight secondary to a syncopal episode in the home. Orthostatics are positive. He is seen sitting up in a chair at bedside this morning. Reports that he feels well this morning. Reports that he's back to baseline. Patient's wife and step-son are at bedside and report that he has been more short of breath over the past several days after pulling a muscle in his pectoral region some time ago. His SOB has improved at this time. However, he has never passed out before. Brief History:     Per my assoicate overnight: This is a 76-year-old male with a past medical history significant of asthma hypertension diabetes and neuropathy who presented to the emergency room with an episode of \"syncope\" while at home watching TV. Shira Bravo Patient states that he was sitting in his recliner watching TV and then the next thing he remembered was his wife walking up to him and he was on the ground. Patient projects that he must have fallen forward as he was found in front of the chair. Patient denies any preceding symptoms such as chest pain shortness of breath dizziness lightheadedness blurred vision or double vision or attempting to get up and out of the chair. Patient denies any post syncopal symptoms such as chest pain dizziness confusion loss of bowel bladder or tongue biting.   Up to this event patient states he has been in his normal health with no acute changes.       The work-up in the emergency room revealed a Bolick panel with a gabapentin  600 mg Oral TID    [Held by provider] lisinopril-hydroCHLOROthiazide  1 tablet Oral BID    sertraline  100 mg Oral Daily    sodium chloride flush  10 mL Intravenous 2 times per day    insulin lispro  0-12 Units Subcutaneous TID     insulin lispro  0-6 Units Subcutaneous Nightly     Continuous Infusions:    dextrose      sodium chloride 75 mL/hr at 20 0002     PRN Meds: potassium chloride **OR** potassium alternative oral replacement **OR** potassium chloride, magnesium sulfate, acetaminophen **OR** acetaminophen, polyethylene glycol, promethazine **OR** ondansetron, nicotine, glucose, dextrose, glucagon (rDNA), dextrose, sodium chloride flush, lidocaine    Data:     Past Medical History:   has a past medical history of Anxiety, Arthritis, Asthma, Hypertension, Neuropathy, Tremor, and Type II or unspecified type diabetes mellitus without mention of complication, not stated as uncontrolled. Social History:   reports that he has been smoking. He has a 20.00 pack-year smoking history. He has never used smokeless tobacco. He reports that he does not drink alcohol or use drugs. Family History:   Family History   Problem Relation Age of Onset    Alzheimer's Disease Mother     High Blood Pressure Mother     Diabetes Mother     Eczema Father     Heart Disease Father     Lung Cancer Father     Arthritis Brother     Asthma Brother     Depression Brother     Diabetes Brother     High Blood Pressure Brother     High Cholesterol Brother     Kidney Disease Brother        Vitals:  /73   Pulse 92   Temp 98.6 °F (37 °C) (Oral)   Resp 18   Ht 6' 1\" (1.854 m)   Wt 280 lb 6.4 oz (127.2 kg)   SpO2 95%   BMI 36.99 kg/m²   Temp (24hrs), Av.2 °F (36.8 °C), Min:97.9 °F (36.6 °C), Max:98.6 °F (37 °C)    Recent Labs     20  2349 20  0710   POCGLU 264* 208*       I/O (24Hr):     Intake/Output Summary (Last 24 hours) at 3/2/2020 1120  Last data filed at 3/2/2020 3. Trace layering left pleural effusion and bibasilar pulmonary interlobular septal thickening suggesting mild congestive heart failure. 4. Prominent azygos vein corresponding to mediastinal abnormality on recent chest radiograph.        Physical Examination:        General appearance:  alert, cooperative and no distress, elderly  gentleman sitting up in chair at bedside  Mental Status:  oriented to person, place and time and normal affect  Lungs:  clear to auscultation bilaterally, normal effort  Heart:  regular rate and rhythm, no murmur, frequent ectopy  Abdomen:  soft, nontender, nondistended, normal bowel sounds, no masses, hepatomegaly, splenomegaly  Extremities:  no edema, redness, tenderness in the calves  Skin:  no gross lesions, rashes, induration    Assessment:        Hospital Problems           Last Modified POA    * (Principal) Syncope and collapse 3/2/2020 Yes    Leukocytosis 3/2/2020 Yes    Pericardial effusion 3/2/2020 Yes    CAMILLE (acute kidney injury) ([facility] [address] 3/2/2020 Yes    Orthostasis 3/2/2020 Yes    Lactic acidosis 3/2/2020 Yes    Cardiomegaly 3/2/2020 Yes    Essential hypertension 3/2/2020 Yes    Controlled type 2 diabetes mellitus with neuropathy ([address] 3/2/2020 Yes    Mild intermittent asthma without complication 7/9/2989 Yes    Interstitial lung disease ([facility] [address] 3/2/2020 Yes          Plan:        Stop IV antibiotics  Check 2D echo - cardio consult  Orthostatics positive; may need to back off of beta blocker, which was added overnight  Check CT brain - neurology consult; concern for seizure, as he has an elevated prolactin level  Procalcitonin - 0.16  Continue IVF rehydration - renal function is improving, continue to trend renal funciton  Hold Lisinopril/HCTZ with acute renal insufficiency  Treat underlying COPD - albuterol added; add robitussin DM  Check CBC in AM  DVT ppx with Lovenox    PARAG CASTILLO DO  3/2/2020  11:20 AM

## 2020-03-02 NOTE — PROGRESS NOTES
transfer to simulate transfer without touching grab bar  Tone RUE  RUE Tone: Normotonic  Tone LUE  LUE Tone: Normotonic  Coordination  Movements Are Fluid And Coordinated: No  Coordination and Movement description: Decreased speed     Bed mobility  Supine to Sit: Unable to assess  Sit to Supine: Unable to assess  Scooting: Supervision  Comment: Pt sitting in recliner upon arrival/exit this date  Transfers  Sit to stand: Minimal assistance  Stand to sit: Contact guard assistance     Cognition  Overall Cognitive Status: WFL        Sensation  Overall Sensation Status: WFL      LUE AROM (degrees)  LUE AROM : WFL  RUE AROM (degrees)  RUE AROM : WFL  LUE Strength  Gross LUE Strength: WFL  L Shoulder Flex: 4+/5  L Elbow Flex: 5/5  L Elbow Ext: 5/5  L Hand General: 5/5  RUE Strength  Gross RUE Strength: WFL  R Shoulder Flex: 4+/5  R Elbow Flex: 5/5  R Elbow Ext: 5/5  R Hand General: 5/5      Plan   Plan  Times per week: 4x  Current Treatment Recommendations: Balance Training, Functional Mobility Training, Endurance Training, Home Management Training, Equipment Evaluation, Education, & procurement, Patient/Caregiver Education & Training, Safety Education & Training, Self-Care / ADL     AM-[facility] Inpatient Daily Activity Raw Score: 19 (03/02/20 1539)  AM-[facility] Inpatient ADL T-Scale Score : 40.22 (03/02/20 1539)  ADL Inpatient CMS 0-100% Score: 42.8 (03/02/20 1539)  ADL Inpatient CMS G-Code Modifier : CK (03/02/20 1539)    Goals  Short term goals  Time Frame for Short term goals: Pt will by discharge   Short term goal 1: demo ADL UB/LB dressing/bathing activity with increased time and mod I  Short term goal 2: demo good safety awareness during func mob around room using LRD and mod I  Short term goal 3: demo (I) with all bed mob/transfers  Short term goal 4: demo bending/reaching func activity while standing using LRD and SUP  Short term goal 5: demo BUE shoulder strength of 5/5 grossly for use in ADL completion     Therapy Time

## 2020-03-02 NOTE — H&P
[facility]    HISTORY AND PHYSICAL EXAMINATION            Date:   3/1/2020  Patient name:  Malcolm Starkey  Date of admission:  3/1/2020  6:06 PM  MRN:   [id number]  Account:  [de-identified]  YOB: 1955  PCP:    Bethany Thurston MD  Room:   3014/3014-01  Code Status:    Full Code    Chief Complaint:     Chief Complaint   Patient presents with    Loss of Consciousness       History Obtained From:     patient, electronic medical record    History of Present Illness:     Malcolm Starkey is a 71 y.o. Non-/non  male who presents with Loss of Consciousness   and is admitted to the hospital for the management of syncope with loss of consciousness      This is a 56-year-old male with a past medical history significant of asthma hypertension diabetes and neuropathy who presented to the emergency room with an episode of \"syncope\" while at home watching TV. [facility] Patient states that he was sitting in his recliner watching TV and then the next thing he remembered was his wife walking up to him and he was on the ground. Patient projects that he must have fallen forward as he was found in front of the chair. Patient denies any preceding symptoms such as chest pain shortness of breath dizziness lightheadedness blurred vision or double vision or attempting to get up and out of the chair. Patient denies any post syncopal symptoms such as chest pain dizziness confusion loss of bowel bladder or tongue biting. Up to this event patient states he has been in his normal health with no acute changes. The work-up in the emergency room revealed a Bolick panel with a sodium of 130 potassium 3.7 chloride 94 CO2 19 BUN of 29 creatinine 1.40. Glucose 136. proBNP 319 and troponin 16. Hemogram with significant leukocytosis with a WBC of 18.4 hemoglobin of 12.4 hematocrit of 38.6 monos 1.8 sag neutrophils 76 absolute segs 13.99.   Urinalysis unremarkable with few bacteria but negative for nitrates or leukocytes. Chest x-ray 1. Cardiomegaly 2. Prominent interstitial markings, differential considerations to include an  acute process such as interstitial edema versus chronic underlying interstitial lung disease 3. Fullness of the mediastinum.  CT imaging of the chest would be helpful for further evaluation  CT scan chest- 1. No evidence of pulmonary embolism. 2. Moderate pericardial effusion. 3. Trace layering left pleural effusion and bibasilar pulmonary interlobular septal thickening suggesting mild congestive heart failure. 4. Prominent azygos vein corresponding to mediastinal abnormality on recent chest radiograph  EKG- initial report initially called for syncopal episode with EKG concerning for STEMI per the computer read. 12-lead was transmitted and it appears there was baseline variability causing the computer to read STEMI sinus rhythm with frequent PVCs nonspecific EKG. Past Medical History:     Past Medical History:   Diagnosis Date    Anxiety     Arthritis     Asthma     Hypertension     Neuropathy     Tremor     Type II or unspecified type diabetes mellitus without mention of complication, not stated as uncontrolled         Past Surgical History:     Past Surgical History:   Procedure Laterality Date    TONSILLECTOMY          Medications Prior to Admission:     Prior to Admission medications    Medication Sig Start Date End Date Taking?  Authorizing Provider   HYDROcodone-acetaminophen (NORCO) 5-325 MG per tablet Take 2 tablets by mouth every 6 hours as needed for Pain   Yes Historical Provider, MD   amoxicillin (AMOXIL) 500 MG capsule Take 500 mg by mouth 3 times daily   Yes Historical Provider, MD   ibuprofen (ADVIL;MOTRIN) 800 MG tablet Take 800 mg by mouth every 8 hours as needed for Pain   Yes Historical Provider, MD   lisinopril-hydrochlorothiazide (PRINZIDE;ZESTORETIC) 20-12.5 MG per tablet Take 1 tablet by mouth 2 times daily. Yes Historical Provider, MD   glipiZIDE (GLUCOTROL) 10 MG tablet Take 5 mg by mouth every morning (before breakfast) Indications: every am    Yes Historical Provider, MD   gabapentin (NEURONTIN) 600 MG tablet Take 600 mg by mouth 3 times daily. Yes Historical Provider, MD   metFORMIN (GLUCOPHAGE) 1000 MG tablet Take 1,000 mg by mouth 2 times daily (with meals). Yes Historical Provider, MD   sertraline (ZOLOFT) 100 MG tablet Take 100 mg by mouth daily. Yes Historical Provider, MD   aspirin 81 MG tablet Take 81 mg by mouth daily. Yes Historical Provider, MD   albuterol (PROVENTIL) (2.5 MG/3ML) 0.083% nebulizer solution Take 2.5 mg by nebulization every 6 hours as needed for Wheezing. Yes Historical Provider, MD   ipratropium (ATROVENT) 0.02 % nebulizer solution Take 0.5 mg by nebulization 4 times daily. Yes Historical Provider, MD   Albuterol Sulfate (VENTOLIN HFA IN) Inhale  into the lungs. Yes Historical Provider, MD   ALPRAZolam Radha Burn) 0.5 MG tablet Take 0.5 mg by mouth 2 times daily. Yes Historical Provider, MD   insulin glargine (LANTUS) 100 UNIT/ML injection Inject  into the skin nightly. Yes Historical Provider, MD        Allergies:     Morphine    Social History:     Tobacco:    reports that he has been smoking. He has a 20.00 pack-year smoking history. He has never used smokeless tobacco.  Alcohol:      reports no history of alcohol use. Drug Use:  reports no history of drug use. Family History:     Family History   Problem Relation Age of Onset    Alzheimer's Disease Mother     High Blood Pressure Mother     Diabetes Mother     Eczema Father     Heart Disease Father     Lung Cancer Father     Arthritis Brother     Asthma Brother     Depression Brother     Diabetes Brother     High Blood Pressure Brother     High Cholesterol Brother     Kidney Disease Brother        Review of Systems:     Positive and Negative as described in HPI.     Review of Systems Constitutional: Negative for chills, diaphoresis and fever. HENT: Negative for congestion and hearing loss. Respiratory: Negative for cough, shortness of breath, wheezing and stridor. Cardiovascular: Negative for chest pain, palpitations and leg swelling. Gastrointestinal: Negative for abdominal pain, blood in stool, constipation, diarrhea, nausea and vomiting. Genitourinary: Negative for dysuria and frequency. Musculoskeletal: Negative for myalgias. Skin: Negative for rash. Neurological: Positive for syncope. Negative for dizziness, seizures and headaches. Psychiatric/Behavioral: The patient is not nervous/anxious. Physical Exam:   /88   Pulse 102   Temp 98.1 °F (36.7 °C) (Oral)   Resp 20   Ht 6' 1\" (1.854 m)   Wt 281 lb (127.5 kg)   SpO2 94%   BMI 37.07 kg/m²   Temp (24hrs), Av.1 °F (36.7 °C), Min:98.1 °F (36.7 °C), Max:98.1 °F (36.7 °C)    No results for input(s): POCGLU in the last 72 hours. No intake or output data in the 24 hours ending 20 0653    Physical Exam  Vitals signs and nursing note reviewed. Constitutional:       General: He is not in acute distress. Appearance: He is well-developed. He is not diaphoretic. HENT:      Head: Normocephalic and atraumatic. Right Ear: Hearing normal.      Left Ear: Hearing normal.      Nose: Nose normal. No rhinorrhea. Eyes:      General: Lids are normal.      Extraocular Movements:      Right eye: Normal extraocular motion. Left eye: Normal extraocular motion. Conjunctiva/sclera: Conjunctivae normal.      Right eye: Right conjunctiva is not injected. Left eye: Left conjunctiva is not injected. Pupils: Pupils are equal, round, and reactive to light. Pupils are equal.      Right eye: Pupil is reactive. Left eye: Pupil is reactive. Neck:      Musculoskeletal: Neck supple. Thyroid: No thyromegaly. Vascular: No carotid bruit or JVD.       Trachea: Trachea and phonation normal. No tracheal deviation. Cardiovascular:      Rate and Rhythm: Regular rhythm. Tachycardia present. Pulses:           Carotid pulses are 1+ on the right side and 1+ on the left side. Radial pulses are 1+ on the right side and 1+ on the left side. Dorsalis pedis pulses are 1+ on the right side and 1+ on the left side. Posterior tibial pulses are 1+ on the right side and 1+ on the left side. Heart sounds: Normal heart sounds. No murmur. Comments: Frequent PVCs    Pulmonary:      Effort: Pulmonary effort is normal. No respiratory distress. Breath sounds: Normal breath sounds. No stridor. No decreased breath sounds. Abdominal:      General: Bowel sounds are normal. There is no distension. Palpations: Abdomen is soft. There is no mass. Tenderness: There is no abdominal tenderness. There is no guarding. Musculoskeletal:         General: No tenderness. Skin:     General: Skin is warm and dry. Findings: No erythema, lesion or rash. Neurological:      General: No focal deficit present. Mental Status: He is alert and oriented to person, place, and time. He is not disoriented. GCS: GCS eye subscore is 4. GCS verbal subscore is 5. GCS motor subscore is 6. Cranial Nerves: No cranial nerve deficit. Sensory: Sensation is intact. No sensory deficit. Motor: Motor function is intact. Psychiatric:         Attention and Perception: Attention normal.         Mood and Affect: Mood normal.         Speech: Speech normal.         Behavior: Behavior normal. Behavior is cooperative.          Investigations:      Laboratory Testing:  Recent Results (from the past 24 hour(s))   CBC Auto Differential    Collection Time: 03/01/20  6:14 PM   Result Value Ref Range    WBC 18.4 (H) 3.5 - 11.3 k/uL    RBC 4.17 (L) 4.21 - 5.77 m/uL    Hemoglobin 12.4 (L) 13.0 - 17.0 g/dL    Hematocrit 38.6 (L) 40.7 - 50.3 %    MCV 92.6 82.6 - 102.9 fL    MCH 29.7 25.2 - 33.5 pg    MCHC 32.1 28.4 - 34.8 g/dL    RDW 13.2 11.8 - 14.4 %    Platelets 807 845 - 536 k/uL    MPV 10.3 8.1 - 13.5 fL    NRBC Automated 0.0 0.0 per 100 WBC    Differential Type NOT REPORTED     WBC Morphology NOT REPORTED     RBC Morphology NOT REPORTED     Platelet Estimate NOT REPORTED     Immature Granulocytes 0 0 %    Seg Neutrophils 76 (H) 36 - 66 %    Lymphocytes 13 (L) 24 - 44 %    Monocytes 10 (H) 1 - 7 %    Eosinophils % 1 1 - 4 %    Basophils 0 0 - 2 %    Absolute Immature Granulocyte 0.00 0.00 - 0.30 k/uL    Segs Absolute 13.99 (H) 1.8 - 7.7 k/uL    Absolute Lymph # 2.39 1.0 - 4.8 k/uL    Absolute Mono # 1.84 (H) 0.1 - 0.8 k/uL    Absolute Eos # 0.18 0.0 - 0.4 k/uL    Basophils Absolute 0.00 0.0 - 0.2 k/uL    Morphology Normal    Basic Metabolic Panel w/ Reflex to MG    Collection Time: 03/01/20  6:14 PM   Result Value Ref Range    Glucose 236 (H) 70 - 99 mg/dL    BUN 29 (H) 8 - 23 mg/dL    CREATININE 1.40 (H) 0.70 - 1.20 mg/dL    Bun/Cre Ratio NOT REPORTED 9 - 20    Calcium 9.5 8.6 - 10.4 mg/dL    Sodium 130 (L) 135 - 144 mmol/L    Potassium 3.7 3.7 - 5.3 mmol/L    Chloride 94 (L) 98 - 107 mmol/L    CO2 19 (L) 20 - 31 mmol/L    Anion Gap 17 9 - 17 mmol/L    GFR Non-African American 51 (L) >60 mL/min    GFR African American >60 >60 mL/min    GFR Comment          GFR Staging NOT REPORTED    Troponin    Collection Time: 03/01/20  6:14 PM   Result Value Ref Range    Troponin, High Sensitivity 16 0 - 22 ng/L    Troponin T NOT REPORTED <0.03 ng/mL    Troponin Interp NOT REPORTED    Hepatic Function Panel    Collection Time: 03/01/20  6:14 PM   Result Value Ref Range    Alb 3.4 (L) 3.5 - 5.2 g/dL    Alkaline Phosphatase 76 40 - 129 U/L    ALT 15 5 - 41 U/L    AST 23 <40 U/L    Total Bilirubin 0.82 0.3 - 1.2 mg/dL    Bilirubin, Direct 0.34 (H) <0.31 mg/dL    Bilirubin, Indirect 0.48 0.00 - 1.00 mg/dL    Total Protein 8.1 6.4 - 8.3 g/dL    Globulin NOT REPORTED 1.5 - 3.8 g/dL    Albumin/Globulin Ratio 0.7 (L) 1.0 - 2.5   Protime-INR    Collection Time: 03/01/20  6:14 PM   Result Value Ref Range    Protime 11.1 9.0 - 12.0 sec    INR 1.1    APTT    Collection Time: 03/01/20  6:14 PM   Result Value Ref Range    PTT 24.3 20.5 - 30.5 sec   Lactate, Sepsis    Collection Time: 03/01/20  6:57 PM   Result Value Ref Range    Lactic Acid, Sepsis NOT REPORTED 0.5 - 1.9 mmol/L    Lactic Acid, Sepsis, Whole Blood 3.1 (H) 0.5 - 1.9 mmol/L   Troponin    Collection Time: 03/01/20  7:26 PM   Result Value Ref Range    Troponin, High Sensitivity 16 0 - 22 ng/L    Troponin T NOT REPORTED <0.03 ng/mL    Troponin Interp NOT REPORTED    Brain Natriuretic Peptide    Collection Time: 03/01/20  7:26 PM   Result Value Ref Range    Pro- (H) <300 pg/mL    BNP Interpretation Pro-BNP Reference Range:    Lactate, Sepsis    Collection Time: 03/01/20  8:42 PM   Result Value Ref Range    Lactic Acid, Sepsis NOT REPORTED 0.5 - 1.9 mmol/L    Lactic Acid, Sepsis, Whole Blood 1.8 0.5 - 1.9 mmol/L   Urinalysis with Microscopic    Collection Time: 03/01/20  9:30 PM   Result Value Ref Range    Color, UA YELLOW YELLOW    Turbidity UA CLEAR CLEAR    Glucose, Ur NEGATIVE NEGATIVE    Bilirubin Urine NEGATIVE NEGATIVE    Ketones, Urine NEGATIVE NEGATIVE    Specific Gravity, UA 1.041 (H) 1.005 - 1.030    Urine Hgb NEGATIVE NEGATIVE    pH, UA 6.0 5.0 - 8.0    Protein, UA 1+ (A) NEGATIVE    Urobilinogen, Urine Normal Normal    Nitrite, Urine NEGATIVE NEGATIVE    Leukocyte Esterase, Urine NEGATIVE NEGATIVE    -          WBC, UA 2 TO 5 0 - 5 /HPF    RBC, UA 0 TO 2 0 - 2 /HPF    Casts UA 10 TO 20 0 - 2 /LPF    Casts UA HYALINE 0 - 2 /LPF    Crystals, UA NOT REPORTED None /HPF    Epithelial Cells UA 2 TO 5 0 - 5 /HPF    Renal Epithelial, UA NOT REPORTED 0 /HPF    Bacteria, UA FEW (A) None    Mucus, UA NOT REPORTED None    Trichomonas, UA NOT REPORTED None    Amorphous, UA NOT REPORTED None    Other Observations UA NOT REPORTED NOT REQ.     Yeast, UA NOT REPORTED None Imaging/Diagnostics:  Xr Chest Standard (2 Vw)    Result Date: 3/1/2020  1. Cardiomegaly 2. Prominent interstitial markings, differential considerations to include an acute process such as interstitial edema versus chronic underlying interstitial lung disease 3. Fullness of the mediastinum. CT imaging of the chest would be helpful for further evaluation. Ct Chest Pulmonary Embolism W Contrast    Result Date: 3/1/2020  1. No evidence of pulmonary embolism. 2. Moderate pericardial effusion. 3. Trace layering left pleural effusion and bibasilar pulmonary interlobular septal thickening suggesting mild congestive heart failure. 4. Prominent azygos vein corresponding to mediastinal abnormality on recent chest radiograph. Assessment :      Hospital Problems           Last Modified POA    Syncope and collapse 3/1/2020 Yes    Leukocytosis 3/2/2020 Yes    Pericardial effusion 3/2/2020 Yes    Lactic acidosis 3/2/2020 Yes    Cardiomegaly 3/2/2020 Yes    Essential hypertension 3/2/2020 Yes    Controlled type 2 diabetes mellitus with neuropathy ([facility] [address] 3/2/2020 Yes    Mild intermittent asthma without complication 8/9/0743 Yes    Interstitial lung disease ([address] 3/2/2020 Yes    CAMILLE (acute kidney injury) ([facility] [address] 3/2/2020 Yes          Plan:     Patient status inpatient in the Progressive Unit/Step down    Syncope and collapse-no prodrome to syncope no loss of bowel or bladder no postictal symptoms. Will consult cardiology. Will check a prolactin as he had a lactic acidosis to rule out any seizure. Given the frequent PVCs concern for an arrhythmia. Did start a low-dose beta-blocker for the PVC. Troponins negative at this time we will continue to trend. Do not leave the floor without monitor. He was telemetry until cardiology he waits and cleared to go off telemetry. CTA of the chest without pulmonary embolism.   AG and troponins without ACUTE CONCERN   Leukocytosis of unknown origin-blood cultures obtained, will

## 2020-03-02 NOTE — ED NOTES
Rec'd report from [facility]. Pt resting on cot, provided urinal, awaiting urine specimen.       Svitlana Lara RN  03/01/20 1921

## 2020-03-02 NOTE — ED PROVIDER NOTES
[facility] [address] [facility]  Emergency Department  Emergency Medicine Resident Sign-out     Care of Javier Heredia was assumed from Dr. Pablo Baeza and is being seen for Loss of Consciousness  . The patient's initial evaluation and plan have been discussed with the prior provider who initially evaluated the patient.      EMERGENCY DEPARTMENT COURSE / MEDICAL DECISION MAKING:       MEDICATIONS GIVEN:  Orders Placed This Encounter   Medications    lactated ringers infusion 1,000 mL    iohexol (OMNIPAQUE 350) solution 75 mL    vancomycin (VANCOCIN) 2,000 mg in dextrose 5 % 500 mL IVPB    piperacillin-tazobactam (ZOSYN) 3.375 g in dextrose 5 % 50 mL IVPB (mini-bag)    vancomycin (VANCOCIN) intermittent dosing (placeholder)    vancomycin (VANCOCIN) 1750 mg in dextrose 5% 500 mL IVPB       LABS / RADIOLOGY:     Labs Reviewed   CBC WITH AUTO DIFFERENTIAL - Abnormal; Notable for the following components:       Result Value    WBC 18.4 (*)     RBC 4.17 (*)     Hemoglobin 12.4 (*)     Hematocrit 38.6 (*)     Seg Neutrophils 76 (*)     Lymphocytes 13 (*)     Monocytes 10 (*)     Segs Absolute 13.99 (*)     Absolute Mono # 1.84 (*)     All other components within normal limits   BASIC METABOLIC PANEL W/ REFLEX TO MG FOR LOW K - Abnormal; Notable for the following components:    Glucose 236 (*)     BUN 29 (*)     CREATININE 1.40 (*)     Sodium 130 (*)     Chloride 94 (*)     CO2 19 (*)     GFR Non- 51 (*)     All other components within normal limits   LACTATE, SEPSIS - Abnormal; Notable for the following components:    Lactic Acid, Sepsis, Whole Blood 3.1 (*)     All other components within normal limits   HEPATIC FUNCTION PANEL - Abnormal; Notable for the following components:    Alb 3.4 (*)     Bilirubin, Direct 0.34 (*)     Albumin/Globulin Ratio 0.7 (*)     All other components within normal limits   BRAIN NATRIURETIC PEPTIDE - Abnormal; Notable for the following components:    Pro- (*)     All other components within normal limits   CULTURE, URINE   TROPONIN   TROPONIN   LACTATE, SEPSIS   PROTIME-INR   APTT   URINALYSIS WITH MICROSCOPIC       Xr Chest Standard (2 Vw)    Result Date: 3/1/2020  EXAMINATION: TWO XRAY VIEWS OF THE CHEST 3/1/2020 6:22 pm COMPARISON: None. HISTORY: ORDERING SYSTEM PROVIDED HISTORY: Syncope TECHNOLOGIST PROVIDED HISTORY: Syncope Reason for Exam: upright/ c/o syncope with LOC Acuity: Acute Type of Exam: Initial FINDINGS: Cardiomegaly is present. Interstitial prominence is seen throughout the lungs, and may represent an acute process such as early interstitial edema versus chronic interstitial lung disease. No pneumothorax or focal area of consolidation is present. Osseous structures appear normal for age. Fullness of the superior mediastinum is noted. 1. Cardiomegaly 2. Prominent interstitial markings, differential considerations to include an acute process such as interstitial edema versus chronic underlying interstitial lung disease 3. Fullness of the mediastinum. CT imaging of the chest would be helpful for further evaluation. Ct Chest Pulmonary Embolism W Contrast    Result Date: 3/1/2020  EXAMINATION: CTA OF THE CHEST 3/1/2020 6:57 pm TECHNIQUE: CTA of the chest was performed after the administration of intravenous contrast.  Multiplanar reformatted images are provided for review. MIP images are provided for review. Dose modulation, iterative reconstruction, and/or weight based adjustment of the mA/kV was utilized to reduce the radiation dose to as low as reasonably achievable. COMPARISON: Chest radiograph earlier today. HISTORY: ORDERING SYSTEM PROVIDED HISTORY: Syncopal episode. Chest x-ray concerning for widened mediastinum. TECHNOLOGIST PROVIDED HISTORY: Syncopal episode. Chest x-ray concerning for widened mediastinum. Reason for Exam: Syncopal episode. Chest x-ray concerning for widened mediastinum.  Acuity: Acute Type of Exam: Initial FINDINGS: Pulmonary 3. Leukocytosis, unspecified type    4. Lactic acidosis        DISPOSITION:         DISPOSITION:  []  Discharge   []  Transfer -    []  Admission -     []  Against Medical Advice   []  Eloped   FOLLOW-UP: No follow-up provider specified.    DISCHARGE MEDICATIONS: New Prescriptions    No medications on file          Babs Salazar DO  Emergency Medicine Resident  [place], [place]  03/01/20 2100

## 2020-03-02 NOTE — CONSULTS
Medications:  Prior to Admission medications    Medication Sig Start Date End Date Taking? Authorizing Provider   HYDROcodone-acetaminophen (NORCO) 5-325 MG per tablet Take 2 tablets by mouth every 6 hours as needed for Pain   Yes Historical Provider, MD   ibuprofen (ADVIL;MOTRIN) 800 MG tablet Take 800 mg by mouth every 8 hours as needed for Pain   Yes Historical Provider, MD   lisinopril-hydrochlorothiazide (PRINZIDE;ZESTORETIC) 20-12.5 MG per tablet Take 1 tablet by mouth 2 times daily. Yes Historical Provider, MD   glipiZIDE (GLUCOTROL) 10 MG tablet Take 5 mg by mouth every morning (before breakfast) Indications: every am    Yes Historical Provider, MD   gabapentin (NEURONTIN) 600 MG tablet Take 600 mg by mouth 3 times daily. Yes Historical Provider, MD   metFORMIN (GLUCOPHAGE) 1000 MG tablet Take 1,000 mg by mouth 2 times daily (with meals). Yes Historical Provider, MD   sertraline (ZOLOFT) 100 MG tablet Take 100 mg by mouth daily. Yes Historical Provider, MD   aspirin 81 MG tablet Take 81 mg by mouth daily. Yes Historical Provider, MD   albuterol (PROVENTIL) (2.5 MG/3ML) 0.083% nebulizer solution Take 2.5 mg by nebulization every 6 hours as needed for Wheezing. Yes Historical Provider, MD   ipratropium (ATROVENT) 0.02 % nebulizer solution Take 0.5 mg by nebulization 4 times daily. Yes Historical Provider, MD   Albuterol Sulfate (VENTOLIN HFA IN) Inhale  into the lungs. Yes Historical Provider, MD   ALPRAZolam Kun Eugenio) 0.5 MG tablet Take 0.5 mg by mouth 2 times daily. Yes Historical Provider, MD   insulin glargine (LANTUS) 100 UNIT/ML injection Inject  into the skin nightly.    Yes Historical Provider, MD   amoxicillin (AMOXIL) 500 MG capsule Take 500 mg by mouth 3 times daily    Historical Provider, MD     Current Medications:    Current Facility-Administered Medications   Medication Dose Route Frequency Provider Last Rate Last Dose    metoprolol tartrate (LOPRESSOR) tablet 12.5 mg  12.5 mg Oral BID Deon Ards, APRN - CNP   12.5 mg at 03/02/20 0847    [START ON 3/3/2020] enoxaparin (LOVENOX) injection 30 mg  30 mg Subcutaneous BID Deon Ards, APRN - CNP        albuterol (PROVENTIL) nebulizer solution 2.5 mg  2.5 mg Nebulization Q4H PRN Damaris Estes DO        ALPRAZolam Louann Pillion) tablet 0.5 mg  0.5 mg Oral BID Eduardo Diver, APRN - CNP   0.5 mg at 03/02/20 0847    aspirin EC tablet 81 mg  81 mg Oral Daily Eduardo Diver, APRN - CNP   81 mg at 03/02/20 0847    gabapentin (NEURONTIN) tablet 600 mg  600 mg Oral TID Eduardo Diver, APRN - CNP   600 mg at 03/02/20 0847    [Held by provider] lisinopril-hydroCHLOROthiazide (PRINZIDE;ZESTORETIC) 20-12.5 MG per tablet 1 tablet  1 tablet Oral BID Eduardo Diver, APRN - CNP        sertraline (ZOLOFT) tablet 100 mg  100 mg Oral Daily Eduardo Diver, APRN - CNP   100 mg at 03/02/20 0847    potassium chloride (KLOR-CON M) extended release tablet 40 mEq  40 mEq Oral PRN Eduardo Diver, APRN - CNP        Or    potassium bicarb-citric acid (EFFER-K) effervescent tablet 40 mEq  40 mEq Oral PRN Eduardo Diver, APRN - CNP        Or    potassium chloride 10 mEq/100 mL IVPB (Peripheral Line)  10 mEq Intravenous PRN Eduardo Diver, APRN - CNP        magnesium sulfate 1 g in dextrose 5% 100 mL IVPB  1 g Intravenous PRN Eduardo Diver, APRN - CNP        acetaminophen (TYLENOL) tablet 650 mg  650 mg Oral Q6H PRN Eduardo Diver, APRN - CNP        Or    acetaminophen (TYLENOL) suppository 650 mg  650 mg Rectal Q6H PRN Eduardo Diver, APRN - CNP        polyethylene glycol (GLYCOLAX) packet 17 g  17 g Oral Daily PRN Eduardo Diver, APRN - CNP        promethazine (PHENERGAN) tablet 12.5 mg  12.5 mg Oral Q6H PRN Eduardo Diver, APRN - CNP        Or    ondansetron (ZOFRAN) injection 4 mg  4 mg Intravenous Q6H PRN Eduardo Diver, APRN - CNP  nicotine (NICODERM CQ) 21 MG/24HR 1 patch  1 patch Transdermal Daily PRN Rand Brush, APRN - CNP        glucose (GLUTOSE) 40 % oral gel 15 g  15 g Oral PRN Rand Brush, APRN - CNP        dextrose 50 % IV solution  12.5 g Intravenous PRN Rand Brush, APRN - CNP        glucagon (rDNA) injection 1 mg  1 mg Intramuscular PRN Rand Brush, APRN - CNP        dextrose 5 % solution  100 mL/hr Intravenous PRN Rand Brush, APRN - CNP        0.9 % sodium chloride infusion   Intravenous Continuous Rand Brush, APRN - CNP 75 mL/hr at 03/02/20 0002      sodium chloride flush 0.9 % injection 10 mL  10 mL Intravenous 2 times per day Rand Brush, APRN - CNP        sodium chloride flush 0.9 % injection 10 mL  10 mL Intravenous PRN Rand Brush, APRN - CNP        insulin lispro (HUMALOG) injection vial 0-12 Units  0-12 Units Subcutaneous TID WC Rand Brush, APRN - CNP   6 Units at 03/02/20 1207    insulin lispro (HUMALOG) injection vial 0-6 Units  0-6 Units Subcutaneous Nightly Rand Brush, APRN - CNP   3 Units at 03/02/20 0002    lidocaine (XYLOCAINE) 2 % uro-jet   Topical PRN Urbano Reddy DO         Allergies:  Morphine    Social History:    Social History     Socioeconomic History    Marital status:      Spouse name: Not on file    Number of children: Not on file    Years of education: Not on file    Highest education level: Not on file   Occupational History    Occupation: disability   Social Needs    Financial resource strain: Not on file    Food insecurity:     Worry: Not on file     Inability: Not on file    Transportation needs:     Medical: Not on file     Non-medical: Not on file   Tobacco Use    Smoking status: Current Every Day Smoker     Packs/day: 0.50     Years: 40.00     Pack years: 20.00    Smokeless tobacco: Never Used   Substance and Sexual Activity    Alcohol use: No    Drug use: No    Sexual activity: Not on file   Lifestyle    Physical activity:     Days per week: Not on file     Minutes per session: Not on file    Stress: Not on file   Relationships    Social connections:     Talks on phone: Not on file     Gets together: Not on file     Attends [employer] service: Not on file     Active member of club or organization: Not on file     Attends meetings of clubs or organizations: Not on file     Relationship status: Not on file    Intimate partner violence:     Fear of current or ex partner: Not on file     Emotionally abused: Not on file     Physically abused: Not on file     Forced sexual activity: Not on file   Other Topics Concern    Not on file   Social History Narrative    Not on file     Family History:   Family History   Problem Relation Age of Onset    Alzheimer's Disease Mother     High Blood Pressure Mother     Diabetes Mother     Eczema Father     Heart Disease Father     Lung Cancer Father     Arthritis Brother     Asthma Brother     Depression Brother     Diabetes Brother     High Blood Pressure Brother     High Cholesterol Brother     Kidney Disease Brother        REVIEW OF SYSTEMS   CONSTITUTIONAL: negative except for  fatigue  EYES:  negative  HEENT:  negative  RESPIRATORY: negative except for  dry cough and dyspnea   CARDIOVASCULAR:  negative except for  chest pain, dyspnea, fatigue, syncope  GASTROINTESTINAL:  negative  GENITOURINARY:  negative  INTEGUMENT:  negative  HEMATOLOGIC/LYMPHATIC:  negative  ALLERGIC: allergy to morphine  ENDOCRINE:  History of diabetes  MUSCULOSKELETAL:  negative except for  arthralgias  NEUROLOGICAL:  negative except for syncope, no history of CVA    PHYSICAL EXAM:    Vitals:    VITALS:  /66   Pulse 72   Temp 98.4 °F (36.9 °C) (Oral)   Resp 18   Ht 6' 1\" (1.854 m)   Wt 280 lb 6.4 oz (127.2 kg)   SpO2 97%   BMI 36.99 kg/m²   24HR INTAKE/OUTPUT:      Intake/Output Summary (Last 24 hours) at 3/2/2020

## 2020-03-02 NOTE — PROGRESS NOTES
seated in the bedside chair  Ambulation  Ambulation?: Yes  Ambulation 1  Surface: level tile  Device: No Device  Assistance: Contact guard assistance;Minimal assistance  Quality of Gait: Mild unsteadiness  Gait Deviations: Slow Michelle  Distance: 60ft  Comments: Pt denies dizziness upon standing, however, demonstrates decreased BP upon standing. Pt demonstrates posterior lean upon standing requiring Rosario to correct. Audible shortness of breath noted throughout session, vitals stable SpO2 95% following ambulation. Stairs/Curb  Stairs?: No     Balance  Posture: Good  Sitting - Static: Good;-  Sitting - Dynamic: Fair;+  Standing - Static: Fair;+  Standing - Dynamic: Fair;-  Comments: Standing balance assessed w/ no AD        Plan   Plan  Times per week: 5-6x/week  Current Treatment Recommendations: Strengthening, Transfer Training, Endurance Training, Patient/Caregiver Education & Training, ROM, Balance Training, Gait Training, Home Exercise Program, Functional Mobility Training, Stair training, Safety Education & Training  Safety Devices  Type of devices: Gait belt, Call light within reach, Nurse notified, Patient at risk for falls, Left in chair  Restraints  Initially in place: No    AM-PAC Score     AM-PAC Inpatient Mobility without Stair Climbing Raw Score : 17 (03/02/20 1459)  AM-PAC Inpatient without Stair Climbing T-Scale Score : 48.47 (03/02/20 1459)  Mobility Inpatient CMS 0-100% Score: 32.72 (03/02/20 1459)  Mobility Inpatient without Stair CMS G-Code Modifier : Rex Cabellodandy (03/02/20 1459)       Goals  Short term goals  Time Frame for Short term goals: 14  Short term goal 1: Pt to perform bed mobility independently  Short term goal 2: Demonstrate functional transfers independently  Short term goal 3: Ambulate 200ft w/ least restrictive AD with supervision  Short term goal 4: Ascend/descend 3 stairs with R rail with supervision   Patient Goals   Patient goals :  To go home       Therapy Time   Individual Concurrent

## 2020-03-02 NOTE — PROGRESS NOTES
PT admitted to floor from ER, A&Ox4, ambulatory from stretcher to chair. Vitals stable, no complaints at this time. Call light within reach, will continue to monitor.

## 2020-03-03 ENCOUNTER — APPOINTMENT (OUTPATIENT)
Dept: MRI IMAGING | Age: 65
DRG: 312 | End: 2020-03-03
Payer: COMMERCIAL

## 2020-03-03 LAB
ABSOLUTE EOS #: 0.21 K/UL (ref 0–0.44)
ABSOLUTE IMMATURE GRANULOCYTE: 0.08 K/UL (ref 0–0.3)
ABSOLUTE LYMPH #: 1.29 K/UL (ref 1.1–3.7)
ABSOLUTE MONO #: 0.85 K/UL (ref 0.1–1.2)
ADENOVIRUS PCR: NOT DETECTED
ANION GAP SERPL CALCULATED.3IONS-SCNC: 13 MMOL/L (ref 9–17)
BASOPHILS # BLD: 1 % (ref 0–2)
BASOPHILS ABSOLUTE: 0.06 K/UL (ref 0–0.2)
BORDETELLA PARAPERTUSSIS: NOT DETECTED
BORDETELLA PERTUSSIS PCR: NOT DETECTED
BUN BLDV-MCNC: 23 MG/DL (ref 8–23)
BUN/CREAT BLD: ABNORMAL (ref 9–20)
CALCIUM SERPL-MCNC: 8.3 MG/DL (ref 8.6–10.4)
CHLAMYDIA PNEUMONIAE BY PCR: NOT DETECTED
CHLORIDE BLD-SCNC: 102 MMOL/L (ref 98–107)
CO2: 19 MMOL/L (ref 20–31)
CORONAVIRUS 229E PCR: NOT DETECTED
CORONAVIRUS HKU1 PCR: NOT DETECTED
CORONAVIRUS NL63 PCR: NOT DETECTED
CORONAVIRUS OC43 PCR: NOT DETECTED
CREAT SERPL-MCNC: 0.8 MG/DL (ref 0.7–1.2)
DIFFERENTIAL TYPE: ABNORMAL
EKG ATRIAL RATE: 109 BPM
EKG ATRIAL RATE: 78 BPM
EKG P AXIS: 41 DEGREES
EKG P AXIS: 53 DEGREES
EKG P-R INTERVAL: 164 MS
EKG P-R INTERVAL: 170 MS
EKG Q-T INTERVAL: 368 MS
EKG Q-T INTERVAL: 372 MS
EKG QRS DURATION: 102 MS
EKG QRS DURATION: 98 MS
EKG QTC CALCULATION (BAZETT): 424 MS
EKG QTC CALCULATION (BAZETT): 495 MS
EKG R AXIS: -16 DEGREES
EKG R AXIS: -3 DEGREES
EKG T AXIS: 43 DEGREES
EKG T AXIS: 64 DEGREES
EKG VENTRICULAR RATE: 109 BPM
EKG VENTRICULAR RATE: 78 BPM
EOSINOPHILS RELATIVE PERCENT: 2 % (ref 1–4)
GFR AFRICAN AMERICAN: >60 ML/MIN
GFR NON-AFRICAN AMERICAN: >60 ML/MIN
GFR SERPL CREATININE-BSD FRML MDRD: ABNORMAL ML/MIN/{1.73_M2}
GFR SERPL CREATININE-BSD FRML MDRD: ABNORMAL ML/MIN/{1.73_M2}
GLUCOSE BLD-MCNC: 151 MG/DL (ref 75–110)
GLUCOSE BLD-MCNC: 162 MG/DL (ref 70–99)
GLUCOSE BLD-MCNC: 171 MG/DL (ref 75–110)
GLUCOSE BLD-MCNC: 179 MG/DL (ref 75–110)
GLUCOSE BLD-MCNC: 229 MG/DL (ref 75–110)
HCT VFR BLD CALC: 34.6 % (ref 40.7–50.3)
HEMOGLOBIN: 11.1 G/DL (ref 13–17)
HUMAN METAPNEUMOVIRUS PCR: NOT DETECTED
IMMATURE GRANULOCYTES: 1 %
INFLUENZA A BY PCR: NOT DETECTED
INFLUENZA A H1 (2009) PCR: NORMAL
INFLUENZA A H1 PCR: NORMAL
INFLUENZA A H3 PCR: NORMAL
INFLUENZA B BY PCR: NOT DETECTED
LV EF: 45 %
LVEF MODALITY: NORMAL
LYMPHOCYTES # BLD: 10 % (ref 24–43)
MCH RBC QN AUTO: 29.8 PG (ref 25.2–33.5)
MCHC RBC AUTO-ENTMCNC: 32.1 G/DL (ref 28.4–34.8)
MCV RBC AUTO: 93 FL (ref 82.6–102.9)
MONOCYTES # BLD: 7 % (ref 3–12)
MYCOPLASMA PNEUMONIAE PCR: NOT DETECTED
NRBC AUTOMATED: 0 PER 100 WBC
PARAINFLUENZA 1 PCR: NOT DETECTED
PARAINFLUENZA 2 PCR: NOT DETECTED
PARAINFLUENZA 3 PCR: NOT DETECTED
PARAINFLUENZA 4 PCR: NOT DETECTED
PDW BLD-RTO: 13.3 % (ref 11.8–14.4)
PLATELET # BLD: 304 K/UL (ref 138–453)
PLATELET ESTIMATE: ABNORMAL
PMV BLD AUTO: 10.3 FL (ref 8.1–13.5)
POTASSIUM SERPL-SCNC: 3.7 MMOL/L (ref 3.7–5.3)
RBC # BLD: 3.72 M/UL (ref 4.21–5.77)
RBC # BLD: ABNORMAL 10*6/UL
RESP SYNCYTIAL VIRUS PCR: NOT DETECTED
RHINO/ENTEROVIRUS PCR: NOT DETECTED
SEG NEUTROPHILS: 80 % (ref 36–65)
SEGMENTED NEUTROPHILS ABSOLUTE COUNT: 9.92 K/UL (ref 1.5–8.1)
SODIUM BLD-SCNC: 134 MMOL/L (ref 135–144)
SPECIMEN DESCRIPTION: NORMAL
WBC # BLD: 12.4 K/UL (ref 3.5–11.3)
WBC # BLD: ABNORMAL 10*3/UL

## 2020-03-03 PROCEDURE — 70547 MR ANGIOGRAPHY NECK W/O DYE: CPT

## 2020-03-03 PROCEDURE — 6370000000 HC RX 637 (ALT 250 FOR IP): Performed by: NURSE PRACTITIONER

## 2020-03-03 PROCEDURE — 97116 GAIT TRAINING THERAPY: CPT

## 2020-03-03 PROCEDURE — 94640 AIRWAY INHALATION TREATMENT: CPT

## 2020-03-03 PROCEDURE — 95816 EEG AWAKE AND DROWSY: CPT | Performed by: PSYCHIATRY & NEUROLOGY

## 2020-03-03 PROCEDURE — 6360000002 HC RX W HCPCS: Performed by: NURSE PRACTITIONER

## 2020-03-03 PROCEDURE — 6370000000 HC RX 637 (ALT 250 FOR IP): Performed by: INTERNAL MEDICINE

## 2020-03-03 PROCEDURE — G0378 HOSPITAL OBSERVATION PER HR: HCPCS

## 2020-03-03 PROCEDURE — 80048 BASIC METABOLIC PNL TOTAL CA: CPT

## 2020-03-03 PROCEDURE — 70544 MR ANGIOGRAPHY HEAD W/O DYE: CPT

## 2020-03-03 PROCEDURE — 95816 EEG AWAKE AND DROWSY: CPT

## 2020-03-03 PROCEDURE — 82947 ASSAY GLUCOSE BLOOD QUANT: CPT

## 2020-03-03 PROCEDURE — 93306 TTE W/DOPPLER COMPLETE: CPT

## 2020-03-03 PROCEDURE — 85025 COMPLETE CBC W/AUTO DIFF WBC: CPT

## 2020-03-03 PROCEDURE — 99233 SBSQ HOSP IP/OBS HIGH 50: CPT | Performed by: INTERNAL MEDICINE

## 2020-03-03 PROCEDURE — 6360000002 HC RX W HCPCS: Performed by: INTERNAL MEDICINE

## 2020-03-03 PROCEDURE — 70551 MRI BRAIN STEM W/O DYE: CPT

## 2020-03-03 PROCEDURE — 97110 THERAPEUTIC EXERCISES: CPT

## 2020-03-03 PROCEDURE — 96372 THER/PROPH/DIAG INJ SC/IM: CPT

## 2020-03-03 PROCEDURE — 0100U HC RESPIRPTHGN MULT REV TRANS & AMP PRB TECH 21 TRGT: CPT

## 2020-03-03 PROCEDURE — 99232 SBSQ HOSP IP/OBS MODERATE 35: CPT | Performed by: NURSE PRACTITIONER

## 2020-03-03 PROCEDURE — 36415 COLL VENOUS BLD VENIPUNCTURE: CPT

## 2020-03-03 PROCEDURE — 2060000000 HC ICU INTERMEDIATE R&B

## 2020-03-03 PROCEDURE — 2580000003 HC RX 258: Performed by: NURSE PRACTITIONER

## 2020-03-03 RX ORDER — HYDROCODONE BITARTRATE AND ACETAMINOPHEN 5; 325 MG/1; MG/1
2 TABLET ORAL EVERY 4 HOURS PRN
Status: DISCONTINUED | OUTPATIENT
Start: 2020-03-03 | End: 2020-03-04 | Stop reason: HOSPADM

## 2020-03-03 RX ORDER — HYDROCODONE BITARTRATE AND ACETAMINOPHEN 5; 325 MG/1; MG/1
1 TABLET ORAL EVERY 4 HOURS PRN
Status: DISCONTINUED | OUTPATIENT
Start: 2020-03-03 | End: 2020-03-04 | Stop reason: HOSPADM

## 2020-03-03 RX ORDER — INDOMETHACIN 25 MG/1
25 CAPSULE ORAL
Status: DISCONTINUED | OUTPATIENT
Start: 2020-03-03 | End: 2020-03-04 | Stop reason: HOSPADM

## 2020-03-03 RX ORDER — ALBUTEROL SULFATE 2.5 MG/3ML
2.5 SOLUTION RESPIRATORY (INHALATION)
Status: DISCONTINUED | OUTPATIENT
Start: 2020-03-03 | End: 2020-03-03

## 2020-03-03 RX ORDER — ALBUTEROL SULFATE 2.5 MG/3ML
2.5 SOLUTION RESPIRATORY (INHALATION) 4 TIMES DAILY
Status: DISCONTINUED | OUTPATIENT
Start: 2020-03-04 | End: 2020-03-04 | Stop reason: HOSPADM

## 2020-03-03 RX ORDER — LISINOPRIL 20 MG/1
20 TABLET ORAL DAILY
Status: DISCONTINUED | OUTPATIENT
Start: 2020-03-04 | End: 2020-03-04 | Stop reason: HOSPADM

## 2020-03-03 RX ADMIN — ENOXAPARIN SODIUM 30 MG: 30 INJECTION SUBCUTANEOUS at 12:17

## 2020-03-03 RX ADMIN — Medication 81 MG: at 12:16

## 2020-03-03 RX ADMIN — ALBUTEROL SULFATE 2.5 MG: 2.5 SOLUTION RESPIRATORY (INHALATION) at 23:36

## 2020-03-03 RX ADMIN — GABAPENTIN 600 MG: 600 TABLET ORAL at 20:49

## 2020-03-03 RX ADMIN — INSULIN LISPRO 2 UNITS: 100 INJECTION, SOLUTION INTRAVENOUS; SUBCUTANEOUS at 20:55

## 2020-03-03 RX ADMIN — ACETAMINOPHEN 650 MG: 325 TABLET ORAL at 16:28

## 2020-03-03 RX ADMIN — METOPROLOL TARTRATE 12.5 MG: 25 TABLET ORAL at 12:15

## 2020-03-03 RX ADMIN — INDOMETHACIN 25 MG: 25 CAPSULE ORAL at 20:55

## 2020-03-03 RX ADMIN — Medication 10 ML: at 20:50

## 2020-03-03 RX ADMIN — ENOXAPARIN SODIUM 30 MG: 30 INJECTION SUBCUTANEOUS at 20:49

## 2020-03-03 RX ADMIN — Medication 10 ML: at 12:19

## 2020-03-03 RX ADMIN — GABAPENTIN 600 MG: 600 TABLET ORAL at 12:16

## 2020-03-03 RX ADMIN — INSULIN LISPRO 2 UNITS: 100 INJECTION, SOLUTION INTRAVENOUS; SUBCUTANEOUS at 16:28

## 2020-03-03 RX ADMIN — HYDROCODONE BITARTRATE AND ACETAMINOPHEN 2 TABLET: 5; 325 TABLET ORAL at 23:19

## 2020-03-03 RX ADMIN — METOPROLOL TARTRATE 12.5 MG: 25 TABLET ORAL at 20:49

## 2020-03-03 RX ADMIN — INSULIN LISPRO 2 UNITS: 100 INJECTION, SOLUTION INTRAVENOUS; SUBCUTANEOUS at 08:00

## 2020-03-03 RX ADMIN — INSULIN LISPRO 2 UNITS: 100 INJECTION, SOLUTION INTRAVENOUS; SUBCUTANEOUS at 12:14

## 2020-03-03 RX ADMIN — SERTRALINE 100 MG: 50 TABLET, FILM COATED ORAL at 12:16

## 2020-03-03 ASSESSMENT — PAIN SCALES - GENERAL
PAINLEVEL_OUTOF10: 0
PAINLEVEL_OUTOF10: 7
PAINLEVEL_OUTOF10: 0
PAINLEVEL_OUTOF10: 0
PAINLEVEL_OUTOF10: 8

## 2020-03-03 NOTE — PROGRESS NOTES
(1.854 m)   Wt 282 lb 6.4 oz (128.1 kg)   SpO2 93%   BMI 37.26 kg/m²     Blood pressure range: Systolic (22WID), TIC:741 , Min:108 , AHR:753   ; Diastolic (61CVV), WWF:72, Min:66, Max:86      Review of Systems:  Constitutional  Negative for fever and chills    HEENT  Negative for ear discharge, ear pain, nosebleed    Eyes  Negative for photophobia, pain and discharge    Respiratory  Negative for hemoptysis and sputum    Cardiovascular  Negative for orthopnea, claudication and PND    Gastrointestinal  Negative for abdominal pain, diarrhea, blood in stool    Musculoskeletal  Negative for joint pain, negative for myalgia    Skin  Negative for rash or itching    Endo/heme/allergies  Negative for polydipsia, environmental allergy    Psychiatric/behavioral  Negative for suicidal ideation. Patient is not anxious        NEUROLOGIC EXAMINATION  GENERAL  Appears comfortable and in no distress   HEENT  NC/ AT   NECK  Supple   MENTAL STATUS:  Alert, oriented, intact memory, no confusion, dysarthric speech (does not have dentures in), normal language, no hallucination or delusion   CRANIAL NERVES: II     -      Visual fields intact to confrontation  III,IV,VI -  EOMs full, no afferent defect, no LLUVIA, no ptosis  V     -     Normal facial sensation  VII    -     Normal facial symmetry  VIII   -     Intact hearing  IX,X -     Symmetrical palate  XI    -     Symmetrical shoulder shrug  XII   -     Midline tongue, no atrophy    MOTOR FUNCTION:  Full strength to arms and legs with normal bulk, normal tone and no involuntary movements, no tremor   SENSORY FUNCTION:  Decreased sensation to BLE stocking distribution    CEREBELLAR FUNCTION:  Intact fine motor control over upper limbs with mild physiologic tremor   REFLEX FUNCTION:  Hypoactive KJ and AJ, no perverted reflex, no Babinski sign   STATION and GAIT  Patient unsteady upon standing, requiring assistance.  Did not ambulate due to safety concerns          Data:    Lab Results:

## 2020-03-03 NOTE — PROGRESS NOTES
Problem List   Diagnosis    Chronic pain    Chronic, continuous use of opioids    Back pain    Leg pain    Chronic radicular lumbar pain    Chronic low back pain    Syncope and collapse    Leukocytosis    Pericardial effusion    Lactic acidosis    Cardiomegaly    Essential hypertension    Controlled type 2 diabetes mellitus with neuropathy (HCC)    Mild intermittent asthma without complication    Interstitial lung disease ([facility] [address]    CAMILLE (acute kidney injury) ([facility] [address]    Orthostasis       PLAN:  1. Continue current medications. 2. He is not on medications known to cause orthostatic hypotension. 3. May go to MRI off telemetry  4. Await echo to monitor the effusion and LV function. Discussed with patient and nursing.     Gita Alonso MD

## 2020-03-04 VITALS
HEART RATE: 61 BPM | RESPIRATION RATE: 16 BRPM | HEIGHT: 73 IN | BODY MASS INDEX: 37.57 KG/M2 | TEMPERATURE: 98.4 F | OXYGEN SATURATION: 95 % | SYSTOLIC BLOOD PRESSURE: 113 MMHG | DIASTOLIC BLOOD PRESSURE: 59 MMHG | WEIGHT: 283.5 LBS

## 2020-03-04 PROBLEM — E87.20 LACTIC ACIDOSIS: Status: RESOLVED | Noted: 2020-03-02 | Resolved: 2020-03-04

## 2020-03-04 PROBLEM — R55 SYNCOPE AND COLLAPSE: Status: RESOLVED | Noted: 2020-03-01 | Resolved: 2020-03-04

## 2020-03-04 PROBLEM — I95.1 ORTHOSTASIS: Status: RESOLVED | Noted: 2020-03-02 | Resolved: 2020-03-04

## 2020-03-04 PROBLEM — N17.9 AKI (ACUTE KIDNEY INJURY) (HCC): Status: RESOLVED | Noted: 2020-03-02 | Resolved: 2020-03-04

## 2020-03-04 PROBLEM — D72.829 LEUKOCYTOSIS: Status: RESOLVED | Noted: 2020-03-02 | Resolved: 2020-03-04

## 2020-03-04 LAB
ABSOLUTE EOS #: 0.35 K/UL (ref 0–0.44)
ABSOLUTE IMMATURE GRANULOCYTE: 0.07 K/UL (ref 0–0.3)
ABSOLUTE LYMPH #: 1.62 K/UL (ref 1.1–3.7)
ABSOLUTE MONO #: 0.73 K/UL (ref 0.1–1.2)
BASOPHILS # BLD: 1 % (ref 0–2)
BASOPHILS ABSOLUTE: 0.05 K/UL (ref 0–0.2)
DIFFERENTIAL TYPE: ABNORMAL
EOSINOPHILS RELATIVE PERCENT: 4 % (ref 1–4)
GLUCOSE BLD-MCNC: 180 MG/DL (ref 75–110)
GLUCOSE BLD-MCNC: 182 MG/DL (ref 75–110)
GLUCOSE BLD-MCNC: 215 MG/DL (ref 75–110)
HCT VFR BLD CALC: 33 % (ref 40.7–50.3)
HEMOGLOBIN: 10.4 G/DL (ref 13–17)
IMMATURE GRANULOCYTES: 1 %
LYMPHOCYTES # BLD: 17 % (ref 24–43)
MCH RBC QN AUTO: 30 PG (ref 25.2–33.5)
MCHC RBC AUTO-ENTMCNC: 31.5 G/DL (ref 28.4–34.8)
MCV RBC AUTO: 95.1 FL (ref 82.6–102.9)
MONOCYTES # BLD: 8 % (ref 3–12)
NRBC AUTOMATED: 0 PER 100 WBC
PDW BLD-RTO: 13.1 % (ref 11.8–14.4)
PLATELET # BLD: 301 K/UL (ref 138–453)
PLATELET ESTIMATE: ABNORMAL
PMV BLD AUTO: 9.9 FL (ref 8.1–13.5)
RBC # BLD: 3.47 M/UL (ref 4.21–5.77)
RBC # BLD: ABNORMAL 10*6/UL
SEG NEUTROPHILS: 69 % (ref 36–65)
SEGMENTED NEUTROPHILS ABSOLUTE COUNT: 6.64 K/UL (ref 1.5–8.1)
WBC # BLD: 9.5 K/UL (ref 3.5–11.3)
WBC # BLD: ABNORMAL 10*3/UL

## 2020-03-04 PROCEDURE — 6370000000 HC RX 637 (ALT 250 FOR IP): Performed by: INTERNAL MEDICINE

## 2020-03-04 PROCEDURE — 2700000000 HC OXYGEN THERAPY PER DAY

## 2020-03-04 PROCEDURE — 94640 AIRWAY INHALATION TREATMENT: CPT

## 2020-03-04 PROCEDURE — 36415 COLL VENOUS BLD VENIPUNCTURE: CPT

## 2020-03-04 PROCEDURE — 2580000003 HC RX 258: Performed by: NURSE PRACTITIONER

## 2020-03-04 PROCEDURE — 97110 THERAPEUTIC EXERCISES: CPT

## 2020-03-04 PROCEDURE — 99232 SBSQ HOSP IP/OBS MODERATE 35: CPT | Performed by: NURSE PRACTITIONER

## 2020-03-04 PROCEDURE — 82947 ASSAY GLUCOSE BLOOD QUANT: CPT

## 2020-03-04 PROCEDURE — G0378 HOSPITAL OBSERVATION PER HR: HCPCS

## 2020-03-04 PROCEDURE — 6370000000 HC RX 637 (ALT 250 FOR IP): Performed by: NURSE PRACTITIONER

## 2020-03-04 PROCEDURE — 94761 N-INVAS EAR/PLS OXIMETRY MLT: CPT

## 2020-03-04 PROCEDURE — 85025 COMPLETE CBC W/AUTO DIFF WBC: CPT

## 2020-03-04 PROCEDURE — 6360000002 HC RX W HCPCS: Performed by: NURSE PRACTITIONER

## 2020-03-04 PROCEDURE — 97116 GAIT TRAINING THERAPY: CPT

## 2020-03-04 PROCEDURE — 99232 SBSQ HOSP IP/OBS MODERATE 35: CPT | Performed by: INTERNAL MEDICINE

## 2020-03-04 PROCEDURE — 6360000002 HC RX W HCPCS: Performed by: INTERNAL MEDICINE

## 2020-03-04 PROCEDURE — 96372 THER/PROPH/DIAG INJ SC/IM: CPT

## 2020-03-04 RX ORDER — LISINOPRIL 20 MG/1
20 TABLET ORAL DAILY
Qty: 30 TABLET | Refills: 0 | Status: SHIPPED | OUTPATIENT
Start: 2020-03-05

## 2020-03-04 RX ORDER — PREDNISONE 20 MG/1
20 TABLET ORAL DAILY
Qty: 5 TABLET | Refills: 0 | Status: SHIPPED | OUTPATIENT
Start: 2020-03-04 | End: 2020-03-09

## 2020-03-04 RX ORDER — HYDROCHLOROTHIAZIDE 12.5 MG/1
12.5 CAPSULE, GELATIN COATED ORAL EVERY OTHER DAY
Qty: 15 CAPSULE | Refills: 0 | Status: SHIPPED | OUTPATIENT
Start: 2020-03-04

## 2020-03-04 RX ADMIN — INSULIN LISPRO 2 UNITS: 100 INJECTION, SOLUTION INTRAVENOUS; SUBCUTANEOUS at 17:38

## 2020-03-04 RX ADMIN — SERTRALINE 100 MG: 50 TABLET, FILM COATED ORAL at 07:23

## 2020-03-04 RX ADMIN — INDOMETHACIN 25 MG: 25 CAPSULE ORAL at 07:24

## 2020-03-04 RX ADMIN — INSULIN LISPRO 2 UNITS: 100 INJECTION, SOLUTION INTRAVENOUS; SUBCUTANEOUS at 07:21

## 2020-03-04 RX ADMIN — ALBUTEROL SULFATE 2.5 MG: 2.5 SOLUTION RESPIRATORY (INHALATION) at 11:38

## 2020-03-04 RX ADMIN — Medication 10 ML: at 07:25

## 2020-03-04 RX ADMIN — INDOMETHACIN 25 MG: 25 CAPSULE ORAL at 12:41

## 2020-03-04 RX ADMIN — GABAPENTIN 600 MG: 600 TABLET ORAL at 07:22

## 2020-03-04 RX ADMIN — ENOXAPARIN SODIUM 30 MG: 30 INJECTION SUBCUTANEOUS at 07:22

## 2020-03-04 RX ADMIN — Medication 81 MG: at 07:23

## 2020-03-04 RX ADMIN — INDOMETHACIN 25 MG: 25 CAPSULE ORAL at 16:31

## 2020-03-04 RX ADMIN — METOPROLOL TARTRATE 12.5 MG: 25 TABLET ORAL at 07:23

## 2020-03-04 RX ADMIN — ALBUTEROL SULFATE 2.5 MG: 2.5 SOLUTION RESPIRATORY (INHALATION) at 08:01

## 2020-03-04 RX ADMIN — LISINOPRIL 20 MG: 20 TABLET ORAL at 07:25

## 2020-03-04 RX ADMIN — GABAPENTIN 600 MG: 600 TABLET ORAL at 12:41

## 2020-03-04 RX ADMIN — INSULIN LISPRO 4 UNITS: 100 INJECTION, SOLUTION INTRAVENOUS; SUBCUTANEOUS at 12:42

## 2020-03-04 ASSESSMENT — PAIN SCALES - GENERAL
PAINLEVEL_OUTOF10: 0

## 2020-03-04 NOTE — PROCEDURES
[address]                  [place] [address]                          ELECTROENCEPHALOGRAM REPORT    PATIENT NAME: Bev Munoz                     :        1955  MED REC NO:   [id number]                             ROOM:       8445  ACCOUNT NO:   [de-identified]                           ADMIT DATE: 2020  PROVIDER:     Thea Raines    DATE OF EE2020    ATTENDING OF RECORD:  Miladis Valentin DO    REASON FOR STUDY:  A 76-year-old gentleman with syncope. MEDICATIONS:  Include Indocin, Lopressor, Lovenox, Proventil, Xanax,  aspirin, Neurontin, hydrochlorothiazide, Zoloft, K-Mona, potassium  bicarbonate. INTERPRETATION: This is a 16-channel EEG with one EKG channel recording  performed on a patient described to be awake and drowsy. The patient  shows normal waking rhythms. Background activity consists of well-regulated 10 Hz activity in a 40-60  microvolt range, more prominent over the posterior head area, showing  good reactivity to eye opening and closing. Over the anterior head  regions, there are 15-20 Hz activity in a 20-30 microvolt range. With  drowsiness, there is further intrusion of slower frequencies in a theta  and lesser degree a delta band. This record is not lateralized or epileptiform. Hyperventilation is not performed. Photic stimulation shows no change  of the record. IMPRESSION:  This EEG is within normal limits for an awake and drowsy  patient. No lateralized or epileptiform disturbance is seen.         Cece Lombardo    D: 2020 20:10:42       T: 2020 20:54:26     [email or web address]  Job#: [id number]     Doc#: [id number]    CC:

## 2020-03-04 NOTE — PROGRESS NOTES
MD        Assessment   Takes treatments twice a day at home.      RR dim  Breath Sounds: diminished      Bronchodilator assessment at level  2      []    Bronchodilator Assessment  BRONCHODILATOR ASSESSMENT SCORE  Score 0 1 2 3 4 5   Breath Sounds   []  Patient Baseline []  No Wheeze good aeration []  Faint, scattered wheezing, good aeration [x]  Expiratory Wheezing and or moderately diminished []  Insp/Exp wheeze and/or very diminished []  Insp/Exp and/ or marked distress   Respiratory Rate   []  Patient Baseline []  Less than 20 [x]  Less than 20 []  20-25 []  Greater than 25 []  Greater than 25   Peak flow % of Pred or PB []  NA   []  Greater than 90%  []  81-90% []  71-80% []  Less than or equal to 70%  or unable to perform []  Unable due to Respiratory Distress   Dyspnea re []  Patient Baseline []  No SOB [x]  No SOB []  SOB on exertion []  SOB min activity []  At rest/acute   e FEV% Predicted       []  NA []  Above 69%  []  Unable []  Above 60-69%  []  Unable []  Above 50-59%  []  Unable []  Above 35-49%  []  Unable []  Less than 35%  []  Unable                BRONCHOSPASM/BRONCHOCONSTRICTION     [x]         IMPROVE AERATION/BREATH SOUNDS  [x]   ADMINISTER BRONCHODILATOR THERAPY AS APPROPRIATE  [x]   ASSESS BREATH SOUNDS  [x]   IMPLEMENT AEROSOL/MDI PROTOCOL  [x]   PATIENT EDUCATION AS NEEDED    PROVIDE ADEQUATE OXYGENATION WITH ACCEPTABLE SP02/ABG'S    [x]  IDENTIFY APPROPRIATE OXYGEN THERAPY  [x]   MONITOR SP02/ABG'S AS NEEDED   [x]   PATIENT EDUCATION AS NEEDED

## 2020-03-04 NOTE — DISCHARGE SUMMARY
[facility]    Discharge Summary     Patient ID: Nikita Alonzo  :  1955   MRN: [id number]     ACCOUNT:  [de-identified]   Patient's PCP: Ramya Perez MD  Admit Date: 3/1/2020   Discharge Date: 3/4/2020   Length of Stay: 3  Code Status:  Full Code  Admitting Physician: Cole Parish DO  Discharge Physician: Tata Quezada DO     Active Discharge Diagnoses:     Hospital Problem Lists:  Principal Problem (Resolved):    Syncope and collapse  Active Problems:    Pericardial effusion    Cardiomegaly    Essential hypertension    Controlled type 2 diabetes mellitus with neuropathy (HCC)    Mild intermittent asthma without complication    Interstitial lung disease ([address]  Resolved Problems:    Leukocytosis    CAMILLE (acute kidney injury) ([facility] [address]    Orthostasis    Lactic acidosis      Admission Condition:  good     Discharged Condition: good    Hospital Stay:     Hospital Course:  Nikita Alonzo is a  69-year-old male with a past medical history significant of asthma hypertension diabetes and neuropathy who presented to the emergency room with an episode of \"syncope\" while at home watching TV. Patient was sitting in his recliner watching TV and then the next thing he remembered was his wife walking up to him and he was on the ground. He denied any preceding symptoms such as chest pain shortness of breath dizziness lightheadedness blurred vision or double vision or attempting to get up and out of the chair. Patient denies any post syncopal symptoms such as chest pain dizziness confusion loss of bowel bladder or tongue biting.  Up to this event patient states he has been in his normal health with no acute changes.  He was found to have an elevated creatinine along with orthostatic hypotension. Patient does take lisinopril-hydrochlorothiazide on a daily basis. Chest x-ray revealed fullness of the mediastinum.   CT scan of the chest revealed a moderate pericardial effusion with no evidence of pulmonary embolism. EKG was unremarkable. Cardiology and neurology were involved in patient's case. He underwent MRI/MRI/EEG which were unremarkable. 2D echo revealed a moderate pericardial effusion. Ultimately, the patient was asymptomatic after being admitted to the hospital.  His CAMILLE improved with IV fluid rehydration. His hydrochlorothiazide has been changed to every other day and he will take lisinopril daily. He will follow-up with cardiology in the outpatient setting for repeat 2D echo in 1 week. It is felt that the moderately sized pericardial effusion has not played a role in his syncope at this time. Patient appropriate for discharge home today with close follow-up in the outpatient setting. Patient understands and agrees to the above treatment plan set forth.        Significant therapeutic interventions:   - MRI head/MRA head/neck, EEG - within normal limits  - 2D echo  - IVF rehydration    Significant Diagnostic Studies:   Labs / Micro:  CBC:   Lab Results   Component Value Date    WBC 9.5 03/04/2020    RBC 3.47 03/04/2020    HGB 10.4 03/04/2020    HCT 33.0 03/04/2020    MCV 95.1 03/04/2020    MCH 30.0 03/04/2020    MCHC 31.5 03/04/2020    RDW 13.1 03/04/2020     03/04/2020     CMP:    Lab Results   Component Value Date    GLUCOSE 162 03/03/2020     03/03/2020    K 3.7 03/03/2020     03/03/2020    CO2 19 03/03/2020    BUN 23 03/03/2020    CREATININE 0.80 03/03/2020    ANIONGAP 13 03/03/2020    ALKPHOS 76 03/01/2020    ALT 15 03/01/2020    AST 23 03/01/2020    BILITOT 0.82 03/01/2020    LABALBU 3.4 03/01/2020    ALBUMIN 0.7 03/01/2020    LABGLOM >60 03/03/2020    GFRAA >60 03/03/2020    GFR      03/03/2020    GFR NOT REPORTED 03/03/2020    PROT 8.1 03/01/2020    CALCIUM 8.3 03/03/2020     TSH:    Lab Results   Component Value Date    TSH 3.30 03/02/2020       Radiology:  Xr Chest Standard (2 Vw)    Result Date: 3/1/2020  1. Procedure Orders   DME Order for [facility]) as OP   Order Comments: - DME device ordered - Jobst knee high compression stockings 20-30 mm Hg  - Diagnosis: Orthostatic hypotension  - Length of Need: 12 Months       Time Spent on discharge is  43 mins in patient examination, evaluation, counseling as well as medication reconciliation, prescriptions for required medications, discharge plan and follow up. Electronically signed by   Fawad David DO  3/4/2020  4:29 PM      Thank you Dr. Muna Romeo MD for the opportunity to be involved in this patient's care.

## 2020-03-04 NOTE — PROGRESS NOTES
Discharge instructions have been provided in verbal and written form, IV removed, patient is dressed, and spouse is I the room waiting with the patient for a ride to home.

## 2020-03-04 NOTE — PROGRESS NOTES
nitrates or leukocytes. Chest x-ray 1. Cardiomegaly 2. Prominent interstitial markings, differential considerations to include an  acute process such as interstitial edema versus chronic underlying interstitial lung disease 3. Fullness of the mediastinum.  CT imaging of the chest would be helpful for further evaluation  CT scan chest- 1. No evidence of pulmonary embolism. 2. Moderate pericardial effusion. 3. Trace layering left pleural effusion and bibasilar pulmonary interlobular septal thickening suggesting mild congestive heart failure. 4. Prominent azygos vein corresponding to mediastinal abnormality on recent chest radiograph  EKG- initial report initially called for syncopal episode with EKG concerning for STEMI per the computer read.  12-lead was transmitted and it appears there was baseline variability causing the computer to read STEMI sinus rhythm with frequent PVCs nonspecific EKG. -MRI, EEG, MRA are negative.  -Echo reveals a moderately sized pericardial effusion. I am suspecting that this is the source of his syncope. Awaiting cardiology evaluation for possible paracentesis. Review of Systems:     Constitutional:  negative for chills, fevers, sweats  Respiratory:  Reports chronic shortness of breath; negative for cough, dyspnea on exertion, wheezing  Cardiovascular:  negative for chest pain, chest pressure/discomfort, lower extremity edema, palpitations  Gastrointestinal:  negative for abdominal pain, constipation, diarrhea, nausea, vomiting  Neurological:  negative for dizziness, headache    Medications: Allergies:     Allergies   Allergen Reactions    Morphine Rash       Current Meds:   Scheduled Meds:    indomethacin  25 mg Oral TID WC    lisinopril  20 mg Oral Daily    albuterol  2.5 mg Nebulization 4x daily    metoprolol tartrate  12.5 mg Oral BID    enoxaparin  30 mg Subcutaneous BID    ALPRAZolam  0.5 mg Oral BID    aspirin  81 mg Oral Daily    gabapentin  600 mg Oral TID  sertraline  100 mg Oral Daily    sodium chloride flush  10 mL Intravenous 2 times per day    insulin lispro  0-12 Units Subcutaneous TID WC    insulin lispro  0-6 Units Subcutaneous Nightly     Continuous Infusions:    dextrose      sodium chloride 75 mL/hr at 20 1219     PRN Meds: HYDROcodone 5 mg - acetaminophen **OR** HYDROcodone 5 mg - acetaminophen, albuterol, potassium chloride **OR** potassium alternative oral replacement **OR** potassium chloride, magnesium sulfate, acetaminophen **OR** acetaminophen, polyethylene glycol, promethazine **OR** ondansetron, nicotine, glucose, dextrose, glucagon (rDNA), dextrose, sodium chloride flush, lidocaine    Data:     Past Medical History:   has a past medical history of Anxiety, Arthritis, Asthma, Hypertension, Neuropathy, Tremor, and Type II or unspecified type diabetes mellitus without mention of complication, not stated as uncontrolled. Social History:   reports that he has been smoking. He has a 20.00 pack-year smoking history. He has never used smokeless tobacco. He reports that he does not drink alcohol or use drugs. Family History:   Family History   Problem Relation Age of Onset    Alzheimer's Disease Mother     High Blood Pressure Mother     Diabetes Mother     Eczema Father     Heart Disease Father     Lung Cancer Father     Arthritis Brother     Asthma Brother     Depression Brother     Diabetes Brother     High Blood Pressure Brother     High Cholesterol Brother     Kidney Disease Brother        Vitals:  BP (!) 113/59   Pulse 61   Temp 98.4 °F (36.9 °C) (Oral)   Resp 16   Ht 6' 1\" (1.854 m)   Wt 283 lb 8 oz (128.6 kg)   SpO2 95%   BMI 37.40 kg/m²   Temp (24hrs), Av.5 °F (36.9 °C), Min:97.8 °F (36.6 °C), Max:99.3 °F (37.4 °C)    Recent Labs     20  1555 20  2040 20  0718 20  1045   POCGLU 179* 229* 180* 215*       I/O (24Hr):     Intake/Output Summary (Last 24 hours) at 3/4/2020 1421  Last data filed at 3/4/2020 1252  Gross per 24 hour   Intake 840 ml   Output 1100 ml   Net -260 ml       Labs:  Hematology:  Recent Labs     03/01/20 1814 03/02/20  0409 03/03/20  0650 03/04/20  0650   WBC 18.4* 16.4* 12.4* 9.5   RBC 4.17* 3.96* 3.72* 3.47*   HGB 12.4* 11.6* 11.1* 10.4*   HCT 38.6* 36.5* 34.6* 33.0*   MCV 92.6 92.2 93.0 95.1   MCH 29.7 29.3 29.8 30.0   MCHC 32.1 31.8 32.1 31.5   RDW 13.2 13.2 13.3 13.1    343 304 301   MPV 10.3 10.5 10.3 9.9   INR 1.1  --   --   --      Chemistry:  Recent Labs     03/01/20 1814 03/01/20  1926 03/02/20  0021 03/02/20  0409 03/03/20  0650   *  --   --  132* 134*   K 3.7  --   --  4.2 3.7   CL 94*  --   --  97* 102   CO2 19*  --   --  17* 19*   GLUCOSE 236*  --   --  230* 162*   BUN 29*  --   --  28* 23   CREATININE 1.40*  --   --  1.24* 0.80   MG  --   --   --  2.0  --    ANIONGAP 17  --   --  18* 13   LABGLOM 51*  --   --  59* >60   GFRAA >60  --   --  >60 >60   CALCIUM 9.5  --   --  9.2 8.3*   CAION  --   --   --  1.11*  --    PHOS  --   --   --  3.8  --    PROBNP  --  319* 272  --   --    TROPHS 16 16 16 17  --      Recent Labs     03/01/20  1814 03/02/20  0021  03/02/20  1141  03/03/20  0635 03/03/20  1206 03/03/20  1555 03/03/20  2040 03/04/20  0718 03/04/20  1045   PROT 8.1  --   --   --   --   --   --   --   --   --   --   --    LABALBU 3.4*  --   --   --   --   --   --   --   --   --   --   --    LABA1C  --   --  7.7*  --   --   --   --   --   --   --   --   --    TSH  --   --   --   --  3.30  --   --   --   --   --   --   --    AST 23  --   --   --   --   --   --   --   --   --   --   --    ALT 15  --   --   --   --   --   --   --   --   --   --   --    ALKPHOS 76  --   --   --   --   --   --   --   --   --   --   --    BILITOT 0.82  --   --   --   --   --   --   --   --   --   --   --    BILIDIR 0.34*  --   --   --   --   --   --   --   --   --   --   --    POCGLU  --    < >  --    < >  --    < > 171* 151* 179* 229* 180* 215*    < > = values in acidosis 3/2/2020 Yes    Cardiomegaly 3/2/2020 Yes    Essential hypertension 3/2/2020 Yes    Controlled type 2 diabetes mellitus with neuropathy ([facility] [address] 3/2/2020 Yes    Mild intermittent asthma without complication 6/8/6394 Yes    Interstitial lung disease ([facility] [address] 3/2/2020 Yes          Plan:        [address] cardiology and neurology recommendations  2D echo with preserved ejection fraction with mild diastolic left ventricular dysfunction and moderate pericardial effusion concerning for early tamponade  Await cardio recs regarding pericardial effusion. Continue indocin  MRI/MRA/EEG are all within normal limits  May need to back off of beta blocker  Continue IVF  Restart Lisinopril, hold HCTZ  Resp viral panel PCR - negative  Treat underlying COPD - albuterol, robitussin DM  DVT ppx with Lovenox  Disposition: Discharge is currently pending further evaluation of the moderately sized pericardial effusion. I am concerned that this is the source of his syncope.     33 Ivett Parekh,   3/4/2020  2:21 PM

## 2020-03-04 NOTE — PROGRESS NOTES
Vitals:  /72   Pulse 87   Temp 97.8 °F (36.6 °C) (Oral)   Resp 18   Ht 6' 1\" (1.854 m)   Wt 282 lb 6.4 oz (128.1 kg)   SpO2 97%   BMI 37.26 kg/m²   Temp (24hrs), Av.3 °F (36.8 °C), Min:97.5 °F (36.4 °C), Max:99.3 °F (37.4 °C)    Recent Labs     20  0635 20  1206 20  1555   POCGLU 148* 171* 151* 179*       I/O (24Hr):     Intake/Output Summary (Last 24 hours) at 3/3/2020 2046  Last data filed at 3/3/2020 1525  Gross per 24 hour   Intake 1806 ml   Output 1550 ml   Net 256 ml       Labs:  Hematology:  Recent Labs     20  0409 20  0650   WBC 18.4* 16.4* 12.4*   RBC 4.17* 3.96* 3.72*   HGB 12.4* 11.6* 11.1*   HCT 38.6* 36.5* 34.6*   MCV 92.6 92.2 93.0   MCH 29.7 29.3 29.8   MCHC 32.1 31.8 32.1   RDW 13.2 13.2 13.3    343 304   MPV 10.3 10.5 10.3   INR 1.1  --   --      Chemistry:  Recent Labs     20  0021 20  0409 20  0650   *  --   --  132* 134*   K 3.7  --   --  4.2 3.7   CL 94*  --   --  97* 102   CO2 19*  --   --  17* 19*   GLUCOSE 236*  --   --  230* 162*   BUN 29*  --   --  28* 23   CREATININE 1.40*  --   --  1.24* 0.80   MG  --   --   --  2.0  --    ANIONGAP 17  --   --  18* 13   LABGLOM 51*  --   --  59* >60   GFRAA >60  --   --  >60 >60   CALCIUM 9.5  --   --  9.2 8.3*   CAION  --   --   --  1.11*  --    PHOS  --   --   --  3.8  --    PROBNP  --  319* 272  --   --    TROPHS 16 16 16 17  --      Recent Labs     20  1814  20  0021  20  1131 20  1141 20  1604 20  0635 20  1206 20  1555   PROT 8.1  --   --   --   --   --   --   --   --   --   --    LABALBU 3.4*  --   --   --   --   --   --   --   --   --   --    LABA1C  --   --  7.7*  --   --   --   --   --   --   --   --    TSH  --   --   --   --   --  3.30  --   --   --   --   --    AST 23  --   --   --   --   --   --   --   --   --   --    ALT 15  --   --   -- --   --   --   --   --   --   --    ALKPHOS 76  --   --   --   --   --   --   --   --   --   --    BILITOT 0.82  --   --   --   --   --   --   --   --   --   --    BILIDIR 0.34*  --   --   --   --   --   --   --   --   --   --    POCGLU  --    < >  --    < > 254*  --  145* 148* 171* 151* 179*    < > = values in this interval not displayed. ABG:No results found for: POCPH, PHART, PH, POCPCO2, OYA1KAI, PCO2, POCPO2, PO2ART, PO2, POCHCO3, SDQ5XON, HCO3, NBEA, PBEA, BEART, BE, THGBART, THB, SUF6IQN, YMWN4XOA, D5KESIWO, O2SAT, FIO2  Lab Results   Component Value Date/Time    SPECIAL  R HAND 20ML 03/02/2020 02:04 AM     Lab Results   Component Value Date/Time    CULTURE NO GROWTH 1 DAY 03/02/2020 02:04 AM       [facility]    Result Date: 3/1/2020  1. Cardiomegaly 2. Prominent interstitial markings, differential considerations to include an acute process such as interstitial edema versus chronic underlying interstitial lung disease 3. Fullness of the mediastinum. CT imaging of the chest would be helpful for further evaluation. Ct Chest Pulmonary Embolism W Contrast    Result Date: 3/1/2020  1. No evidence of pulmonary embolism. 2. Moderate pericardial effusion. 3. Trace layering left pleural effusion and bibasilar pulmonary interlobular septal thickening suggesting mild congestive heart failure. 4. Prominent azygos vein corresponding to mediastinal abnormality on recent chest radiograph.        Physical Examination:        General appearance:  alert, cooperative and no distress, elderly  gentleman sitting up in chair at bedside  Mental Status:  oriented to person, place and time and normal affect  Lungs:  clear to auscultation bilaterally, normal effort  Heart:  regular rate and rhythm, no murmur, frequent ectopy  Abdomen:  soft, nontender, nondistended, normal bowel sounds, no masses, hepatomegaly, splenomegaly  Extremities:  no edema, redness, tenderness in the calves  Skin: no gross lesions, rashes, induration    Assessment:        Hospital Problems           Last Modified POA    * (Principal) Syncope and collapse 3/2/2020 Yes    Leukocytosis 3/2/2020 Yes    Pericardial effusion 3/2/2020 Yes    CAMILLE (acute kidney injury) ([facility] [address] 3/2/2020 Yes    Orthostasis 3/2/2020 Yes    Lactic acidosis 3/2/2020 Yes    Cardiomegaly 3/2/2020 Yes    Essential hypertension 3/2/2020 Yes    Controlled type 2 diabetes mellitus with neuropathy ([facility] [place] [address] 3/2/2020 Yes    Mild intermittent asthma without complication 0/6/0997 Yes    Interstitial lung disease ([facility] [place] [address] 3/2/2020 Yes          Plan:        [address] cardiology and neurology recommendations  2D echo with preserved ejection fraction with mild diastolic left ventricular dysfunction and moderate pericardial effusion concerning for early tamponade  Added Indocin for pericardial effusion  MRI/MRA/EEG are all within normal limits  May need to back off of beta blocker  Continue IVF  Restart Lisinopril, hold HCTZ  Check resp viral panel PCR  Treat underlying COPD - albuterol added, robitussin DM  Check CBC in AM  DVT ppx with Lovenox  Disposition: Discharge is currently pending further evaluation of the moderately sized pericardial effusion. I am concerned that this is the source of his syncope.     33 Ivett Parekh DO  3/3/2020  8:46 PM

## 2020-03-04 NOTE — PROGRESS NOTES
Physical Therapy  Facility/Department: [facility] 3  Daily Treatment Note  NAME: Nikita Alonzo  : 1955  MRN: [id number]    Date of Service: 3/4/2020    Discharge Recommendations:  Patient would benefit from continued therapy after discharge   PT Equipment Recommendations  Equipment Needed: No    Assessment   Body structures, Functions, Activity limitations: Decreased functional mobility ; Decreased endurance;Decreased balance;Decreased strength  Assessment: Pt displays decreased endurance and mild balance deficits. Would benefit from continued PT at home to maximize functional outcomes. Prognosis: Good  PT Education: General Safety;Gait Training;Home Exercise Program  Patient Education: safety with stairs  REQUIRES PT FOLLOW UP: Yes  Activity Tolerance  Activity Tolerance: Patient Tolerated treatment well;Patient limited by endurance  Activity Tolerance: fatigued quickly     Patient Diagnosis(es): The primary encounter diagnosis was Syncope and collapse. Diagnoses of Pericardial effusion, Leukocytosis, unspecified type, Lactic acidosis, and Orthostatic hypotension were also pertinent to this visit. has a past medical history of Anxiety, Arthritis, Asthma, Hypertension, Neuropathy, Tremor, and Type II or unspecified type diabetes mellitus without mention of complication, not stated as uncontrolled. has a past surgical history that includes Tonsillectomy. Restrictions  Restrictions/Precautions  Restrictions/Precautions: General Precautions, Fall Risk  Required Braces or Orthoses?: No  Position Activity Restriction  Other position/activity restrictions: Up w/assist  Subjective   General  Chart Reviewed: Yes  Response To Previous Treatment: Patient with no complaints from previous session. Subjective  Subjective: Pt in chair; offers no c/o pain. Agreeable to PT.   General Comment  Comments: Pt returned to chair after session; call light in reach  Pain Screening  Patient Currently in Pain: Denies  Vital Signs  Patient Currently in Pain: Denies       Orientation  Orientation  Overall Orientation Status: Within Normal Limits  Cognition      Objective      Transfers  Sit to Stand: Stand by assistance  Stand to sit: Stand by assistance  Ambulation  Ambulation?: Yes  More Ambulation?: Yes  Ambulation 1  Surface: level tile  Device: No Device  Assistance: Contact guard assistance  Quality of Gait: had a lateral LOB- able to self correct, wide ASHELY, decreased pace  Distance: 10ft  Comments: remainder of ambulation completed with RW due to balance deficits  Ambulation 2  Surface - 2: level tile  Device 2: Rolling Walker  Assistance 2: Contact guard assistance;Stand by assistance  Quality of Gait 2: decreased pace, no LOB, widened ASHELY  Distance: 250ft  Stairs/Curb  Stairs?: Yes  Stairs  # Steps : 5  Stairs Height: 6\"  Rails: Left ascending  Device: No Device  Assistance: Contact guard assistance  Comment: required VCs for adequate foot placement     Balance  Posture: Good  Sitting - Static: Good  Sitting - Dynamic: Good  Standing - Static: Fair;+  Standing - Dynamic: Fair       Exercises:  Seated LE exercise program: Long Arc Quads, hip abduction/adduction, heel/toe raises, and marches. Reps: 20x each  Upper extremity exercises: Bicep curl, shoulder flexion/extension, punches, tricep curl, shoulder abduction/adduction. Reps: 20x each with red tband     Goals  Short term goals  Time Frame for Short term goals: 14  Short term goal 1: Pt to perform bed mobility independently  Short term goal 2: Demonstrate functional transfers independently  Short term goal 3: Ambulate 200ft w/ least restrictive AD with supervision  Short term goal 4: Ascend/descend 3 stairs with R rail with supervision   Patient Goals   Patient goals :  To go home    Plan    Plan  Times per week: 5-6x/week  Current Treatment Recommendations: Strengthening, Transfer Training, Endurance Training, Patient/Caregiver Education & Training, ROM, Balance Training, Gait Training, Home Exercise Program, Functional Mobility Training, Stair training, Safety Education & Training  Safety Devices  Type of devices:  All fall risk precautions in place, Left in chair, Call light within reach, Gait belt, Nurse notified  Restraints  Initially in place: No     Therapy Time   Individual Concurrent Group Co-treatment   Time In 0124         Time Out 0148         Minutes 24         Timed Code Treatment Minutes: [address], PTA

## 2020-03-04 NOTE — PROGRESS NOTES
%.      Neurological Examination:  Mental status   Alert and oriented x 3; following all commands; speech is fluent, no dysarthria, aphasia   Cranial nerves   II - visual fields intact to confrontation; pupils reactive  III, IV, VI - extraocular muscles intact; no LLUVIA; no nystagmus; no ptosis   V - normal facial sensation                                                               VII - normal facial symmetry                                                             VIII - intact hearing                                                                             IX, X - symmetrical palate elevation                                               XI - symmetrical shoulder shrug                                                       XII - midline tongue without atrophy or fasciculation   Motor function  Strength: 5/5 RUE, 5/5 RLE, 5/5 LUE, 5/5  LLE  Normal bulk and tone                  Sensory function  diminished sensation in bilateral glove and stocking distribution     Cerebellar No visible tremors   Reflex function Ankle reflexes-absent; patellar reflexes 1/4  Down going plantar response bilaterally   Gait                  Not tested       DATA      Lab Results   Component Value Date    WBC 9.5 03/04/2020    HGB 10.4 (L) 03/04/2020    HCT 33.0 (L) 03/04/2020     03/04/2020    ALT 15 03/01/2020    AST 23 03/01/2020     (L) 03/03/2020    K 3.7 03/03/2020     03/03/2020    CREATININE 0.80 03/03/2020    BUN 23 03/03/2020    CO2 19 (L) 03/03/2020    TSH 3.30 03/02/2020    INR 1.1 03/01/2020    LABA1C 7.7 (H) 03/02/2020           CT HEAD (3/2/2020): No acute intracranial abnormality     MRI BRAIN (3/3/2020): No acute intracranial abnormality     MRA HEAD & NECK (3/3/2020): Motion artifact, otherwise unremarkable    ECHO (3/3/2020): EF 45%. Mild LV diastolic dysfunction. RA mildly dilated. Moderate circumferential pericardial effusion. Elevated RA filling pressure.   Trivial TR    EEG (3/20/2020):

## 2020-03-04 NOTE — PLAN OF CARE
Problem: Falls - Risk of:  Goal: Will remain free from falls  Description  Will remain free from falls  Outcome: Ongoing     Problem: Metabolic:  Goal: Ability to maintain appropriate glucose levels will improve  Description  Ability to maintain appropriate glucose levels will improve  Outcome: Ongoing

## 2020-03-08 LAB
CULTURE: NORMAL
CULTURE: NORMAL
Lab: NORMAL
Lab: NORMAL
SPECIMEN DESCRIPTION: NORMAL
SPECIMEN DESCRIPTION: NORMAL